# Patient Record
Sex: FEMALE | Race: WHITE | NOT HISPANIC OR LATINO
[De-identification: names, ages, dates, MRNs, and addresses within clinical notes are randomized per-mention and may not be internally consistent; named-entity substitution may affect disease eponyms.]

---

## 2017-01-19 ENCOUNTER — FORM ENCOUNTER (OUTPATIENT)
Age: 52
End: 2017-01-19

## 2017-01-20 ENCOUNTER — APPOINTMENT (OUTPATIENT)
Dept: MAMMOGRAPHY | Facility: IMAGING CENTER | Age: 52
End: 2017-01-20

## 2017-01-20 ENCOUNTER — APPOINTMENT (OUTPATIENT)
Dept: ULTRASOUND IMAGING | Facility: IMAGING CENTER | Age: 52
End: 2017-01-20

## 2017-01-20 ENCOUNTER — OUTPATIENT (OUTPATIENT)
Dept: OUTPATIENT SERVICES | Facility: HOSPITAL | Age: 52
LOS: 1 days | End: 2017-01-20
Payer: COMMERCIAL

## 2017-01-20 DIAGNOSIS — N64.9 DISORDER OF BREAST, UNSPECIFIED: ICD-10-CM

## 2017-01-20 PROCEDURE — 77067 SCR MAMMO BI INCL CAD: CPT

## 2017-01-20 PROCEDURE — 76641 ULTRASOUND BREAST COMPLETE: CPT

## 2017-01-20 PROCEDURE — 77063 BREAST TOMOSYNTHESIS BI: CPT

## 2017-02-08 ENCOUNTER — APPOINTMENT (OUTPATIENT)
Dept: ENDOCRINOLOGY | Facility: CLINIC | Age: 52
End: 2017-02-08

## 2017-02-08 VITALS
SYSTOLIC BLOOD PRESSURE: 100 MMHG | DIASTOLIC BLOOD PRESSURE: 68 MMHG | BODY MASS INDEX: 21.85 KG/M2 | WEIGHT: 128 LBS | HEART RATE: 78 BPM | OXYGEN SATURATION: 98 % | HEIGHT: 64 IN

## 2017-02-09 LAB
25(OH)D3 SERPL-MCNC: 41.4 NG/ML
IRON SERPL-MCNC: 82 UG/DL
T4 FREE SERPL-MCNC: 1.2 NG/DL
THYROGLOB AB SERPL-ACNC: <20 IU/ML
THYROPEROXIDASE AB SERPL IA-ACNC: 897 IU/ML
TSH SERPL-ACNC: 2.09 UIU/ML

## 2017-06-18 ENCOUNTER — RESULT REVIEW (OUTPATIENT)
Age: 52
End: 2017-06-18

## 2017-12-11 ENCOUNTER — APPOINTMENT (OUTPATIENT)
Dept: SURGICAL ONCOLOGY | Facility: CLINIC | Age: 52
End: 2017-12-11
Payer: COMMERCIAL

## 2017-12-11 VITALS
BODY MASS INDEX: 21.85 KG/M2 | RESPIRATION RATE: 14 BRPM | WEIGHT: 128 LBS | SYSTOLIC BLOOD PRESSURE: 106 MMHG | DIASTOLIC BLOOD PRESSURE: 68 MMHG | HEART RATE: 69 BPM | OXYGEN SATURATION: 100 % | HEIGHT: 64 IN

## 2017-12-11 DIAGNOSIS — N64.9 DISORDER OF BREAST, UNSPECIFIED: ICD-10-CM

## 2017-12-11 PROCEDURE — 99213 OFFICE O/P EST LOW 20 MIN: CPT

## 2018-01-21 ENCOUNTER — FORM ENCOUNTER (OUTPATIENT)
Age: 53
End: 2018-01-21

## 2018-01-22 ENCOUNTER — OUTPATIENT (OUTPATIENT)
Dept: OUTPATIENT SERVICES | Facility: HOSPITAL | Age: 53
LOS: 1 days | End: 2018-01-22
Payer: COMMERCIAL

## 2018-01-22 ENCOUNTER — APPOINTMENT (OUTPATIENT)
Dept: MAMMOGRAPHY | Facility: IMAGING CENTER | Age: 53
End: 2018-01-22
Payer: COMMERCIAL

## 2018-01-22 ENCOUNTER — APPOINTMENT (OUTPATIENT)
Dept: ULTRASOUND IMAGING | Facility: IMAGING CENTER | Age: 53
End: 2018-01-22
Payer: COMMERCIAL

## 2018-01-22 DIAGNOSIS — N64.9 DISORDER OF BREAST, UNSPECIFIED: ICD-10-CM

## 2018-01-22 PROCEDURE — 77067 SCR MAMMO BI INCL CAD: CPT | Mod: 26

## 2018-01-22 PROCEDURE — 77063 BREAST TOMOSYNTHESIS BI: CPT

## 2018-01-22 PROCEDURE — 76641 ULTRASOUND BREAST COMPLETE: CPT

## 2018-01-22 PROCEDURE — 77063 BREAST TOMOSYNTHESIS BI: CPT | Mod: 26

## 2018-01-22 PROCEDURE — 76641 ULTRASOUND BREAST COMPLETE: CPT | Mod: 26,50

## 2018-01-22 PROCEDURE — 77067 SCR MAMMO BI INCL CAD: CPT

## 2018-06-20 ENCOUNTER — RESULT REVIEW (OUTPATIENT)
Age: 53
End: 2018-06-20

## 2019-01-28 ENCOUNTER — FORM ENCOUNTER (OUTPATIENT)
Age: 54
End: 2019-01-28

## 2019-01-29 ENCOUNTER — APPOINTMENT (OUTPATIENT)
Dept: MAMMOGRAPHY | Facility: IMAGING CENTER | Age: 54
End: 2019-01-29
Payer: COMMERCIAL

## 2019-01-29 ENCOUNTER — OUTPATIENT (OUTPATIENT)
Dept: OUTPATIENT SERVICES | Facility: HOSPITAL | Age: 54
LOS: 1 days | End: 2019-01-29
Payer: COMMERCIAL

## 2019-01-29 ENCOUNTER — APPOINTMENT (OUTPATIENT)
Dept: ULTRASOUND IMAGING | Facility: IMAGING CENTER | Age: 54
End: 2019-01-29
Payer: COMMERCIAL

## 2019-01-29 DIAGNOSIS — Z00.00 ENCOUNTER FOR GENERAL ADULT MEDICAL EXAMINATION WITHOUT ABNORMAL FINDINGS: ICD-10-CM

## 2019-01-29 PROCEDURE — 77067 SCR MAMMO BI INCL CAD: CPT | Mod: 26

## 2019-01-29 PROCEDURE — 77063 BREAST TOMOSYNTHESIS BI: CPT

## 2019-01-29 PROCEDURE — 77063 BREAST TOMOSYNTHESIS BI: CPT | Mod: 26

## 2019-01-29 PROCEDURE — 76641 ULTRASOUND BREAST COMPLETE: CPT | Mod: 26,50

## 2019-01-29 PROCEDURE — 76641 ULTRASOUND BREAST COMPLETE: CPT

## 2019-01-29 PROCEDURE — 77067 SCR MAMMO BI INCL CAD: CPT

## 2019-02-01 ENCOUNTER — OUTPATIENT (OUTPATIENT)
Dept: OUTPATIENT SERVICES | Facility: HOSPITAL | Age: 54
LOS: 1 days | End: 2019-02-01
Payer: COMMERCIAL

## 2019-02-01 ENCOUNTER — APPOINTMENT (OUTPATIENT)
Dept: RADIOLOGY | Facility: IMAGING CENTER | Age: 54
End: 2019-02-01
Payer: COMMERCIAL

## 2019-02-01 DIAGNOSIS — Z00.8 ENCOUNTER FOR OTHER GENERAL EXAMINATION: ICD-10-CM

## 2019-02-01 PROCEDURE — 77080 DXA BONE DENSITY AXIAL: CPT

## 2019-02-01 PROCEDURE — 77080 DXA BONE DENSITY AXIAL: CPT | Mod: 26

## 2019-07-01 ENCOUNTER — RESULT REVIEW (OUTPATIENT)
Age: 54
End: 2019-07-01

## 2019-07-08 ENCOUNTER — APPOINTMENT (OUTPATIENT)
Dept: ENDOCRINOLOGY | Facility: CLINIC | Age: 54
End: 2019-07-08
Payer: COMMERCIAL

## 2019-07-08 VITALS
SYSTOLIC BLOOD PRESSURE: 110 MMHG | HEART RATE: 65 BPM | DIASTOLIC BLOOD PRESSURE: 70 MMHG | OXYGEN SATURATION: 99 % | WEIGHT: 130 LBS | BODY MASS INDEX: 22.2 KG/M2 | HEIGHT: 64 IN

## 2019-07-08 PROCEDURE — 99214 OFFICE O/P EST MOD 30 MIN: CPT

## 2019-07-08 NOTE — ASSESSMENT
[FreeTextEntry1] : Patient is a 53-year-old female with history of Hashimoto thyroiditis, now with subclinical hypothyroidism, osteoporosis, here for endocrinology followup.\par \par #1 Osteoporosis\par Patient was recently diagnosed with osteoporosis, with femoral neck scored -2.7.\par We will obtain blood work too well secondary causes of osteoporosis.\par Patient is starting calcium supplements 1200 mg once daily total of calcium was recommended.  \par We will check her PTH, vitamin D level today.\par We will repeat her thyroid function today as well.\par \par \par #2 Subclinical Hypothyroidism\par Patient denying any clinical symptoms of hypothyroidism.  For now and monitor with thyroid function tests, informed her that no medication is indicated for now unless a TSH is greater than 10 or if she has significant signs and symptoms of hypothyroidism.  She elected to continue monitoring with lower instead of starting another medication for now\par \par \par I have discussed with the patient that pharmacological therapy is recommended for a patient with a history of fragility fracture or with osteoporosis based upon bone mineral density (BMD) measurement T-score < -2.5.  \par She had no history of esophageal disorder or inability to follow dosing requirement (staying upright for 30 to 60 minutes) or CKD.  Should start Oral bisphosphonate as first line of treatment.  \par \par \par I have recommended the following\par -Get enough calcium and vitamin D, either through diet or\par supplements (at least 1,000-1,200 mg of calcium; 1000-2000IU of vitamin D daily under age 50 or 800-1,000 IU after age 50)\par - Do weight-bearing exercises and stay physically fit, will give referral to physical therapy.  \par - Avoid smoking\par - Don’t drink too much alcohol\par \par Discussed the indications for treatment as well as treatment options including, Bisphosphonates (alendronate, risedronate, ibandronate, zoledronic acid, Denosumab, Raloxifene, as well as teriparatide.  \par   [Bisphosphonate Therapy] : Risks  and benefits of bisphosphonate therapy were  discussed with the patient including gastroesophageal irritation, osteonecrosis of the jaw, and atypical femur fractures, and acute phase reaction [Denosumab Therapy] : Risks  and benefits of denosumab therapy were discussed with the patient including eczema, cellulitis, osteonecrosis of the jaw and atypical femur fractures [Teriparatide Therapy] : Risks  and benefits of Teriparatide therapy were discussed with the patient including potential risk of osteosarcoma

## 2019-07-08 NOTE — HISTORY OF PRESENT ILLNESS
[Low Calcium Intake] : low calcium intake [Premat. Menopause (natural)] : no past or present history of premature natural menopause [Low Vit D Intake/Exposure] : low vitamin D intake [Disordered Eating] : no past or present history of disordered eating [Premat. Menopause (surg)] : no past or present history of premature surgical menopause [Taking Steroids] : no past or present history of taking steroids [Kidney Stones] : no history of kidney stones [Excessive Alcohol Intake] : no past or present history of excessive alcohol intake [Sedentary] : no past or present history of a sedentary lifestyle [Excessive Soda] : no past or present history of excessive soda intake [High Fall Risk] : no fall risk [Current Smoking___(ppd)] : not currently smoking [Uses a Walker/Cane] : no use of a walker/cane [Frequent Falls] : no frequent falls [Family History of Breast Cancer] : no family history of breast cancer [Family History of Osteoporosis] : no family history of osteoporosis [Hyperparathyroidism] : no hyperparathyroidism [Regular Dental Follow-Up] : regular dental follow-up [Family History of Hip Fracture] : no family history of hip fracture [History of Blood Clots] : no history of blood clots [History of Radiation Therapy] : no history of radiation therapy [None] : The patient is currently asymptomatic [Previous Fragility Fracture] : no previous fragility fracture [FreeTextEntry1] : None

## 2019-07-08 NOTE — PHYSICAL EXAM
[Alert] : alert [Well Nourished] : well nourished [No Acute Distress] : no acute distress [Well Developed] : well developed [Normal Sclera/Conjunctiva] : normal sclera/conjunctiva [EOMI] : extra ocular movement intact [No Proptosis] : no proptosis [Normal Oropharynx] : the oropharynx was normal [Thyroid Not Enlarged] : the thyroid was not enlarged [No Thyroid Nodules] : there were no palpable thyroid nodules [No Respiratory Distress] : no respiratory distress [No Accessory Muscle Use] : no accessory muscle use [Normal Rate] : heart rate was normal  [Clear to Auscultation] : lungs were clear to auscultation bilaterally [Normal S1, S2] : normal S1 and S2 [Pedal Pulses Normal] : the pedal pulses are present [Regular Rhythm] : with a regular rhythm [No Edema] : there was no peripheral edema [Normal Bowel Sounds] : normal bowel sounds [Soft] : abdomen soft [Not Tender] : non-tender [Post Cervical Nodes] : posterior cervical nodes [Not Distended] : not distended [Anterior Cervical Nodes] : anterior cervical nodes [Axillary Nodes] : axillary nodes [Normal] : normal and non tender [No Spinal Tenderness] : no spinal tenderness [Spine Straight] : spine straight [No Stigmata of Cushings Syndrome] : no stigmata of cushings syndrome [Normal Gait] : normal gait [Normal Strength/Tone] : muscle strength and tone were normal [Acanthosis Nigricans] : no acanthosis nigricans [No Rash] : no rash [No Tremors] : no tremors [Normal Reflexes] : deep tendon reflexes were 2+ and symmetric [Oriented x3] : oriented to person, place, and time

## 2019-07-08 NOTE — DATA REVIEWED
[FreeTextEntry1] : Jan 2019\par free T4 10.8\par TSH 5.45\par Vitamin D 36.5\par TOTAL CHOLESTEROL 179\par \par TRIGLYCERDIES 36\par A1C 5.1%\par \par ARPIL 2019\par TSH 5.730 UIU/ML\par FREE T4 1.09 NG/D

## 2019-07-09 ENCOUNTER — TRANSCRIPTION ENCOUNTER (OUTPATIENT)
Age: 54
End: 2019-07-09

## 2019-07-26 LAB
25(OH)D3 SERPL-MCNC: 37.1 NG/ML
ALBUMIN SERPL ELPH-MCNC: 4.8 G/DL
ALP BLD-CCNC: 37 U/L
ALT SERPL-CCNC: 6 U/L
ANION GAP SERPL CALC-SCNC: 11 MMOL/L
AST SERPL-CCNC: 11 U/L
BILIRUB SERPL-MCNC: 0.4 MG/DL
BUN SERPL-MCNC: 10 MG/DL
CALCIUM SERPL-MCNC: 9.8 MG/DL
CALCIUM SERPL-MCNC: 9.8 MG/DL
CHLORIDE SERPL-SCNC: 102 MMOL/L
CO2 SERPL-SCNC: 27 MMOL/L
CREAT SERPL-MCNC: 0.64 MG/DL
GLUCOSE SERPL-MCNC: 92 MG/DL
PARATHYROID HORMONE INTACT: 48 PG/ML
PHOSPHATE SERPL-MCNC: 3.6 MG/DL
POTASSIUM SERPL-SCNC: 3.8 MMOL/L
PROT SERPL-MCNC: 6.9 G/DL
SODIUM SERPL-SCNC: 140 MMOL/L
T4 FREE SERPL-MCNC: 0.9 NG/DL
TSH SERPL-ACNC: 5.75 UIU/ML

## 2019-08-08 ENCOUNTER — APPOINTMENT (OUTPATIENT)
Dept: INTERNAL MEDICINE | Facility: CLINIC | Age: 54
End: 2019-08-08
Payer: COMMERCIAL

## 2019-08-08 DIAGNOSIS — Z86.011 PERSONAL HISTORY OF BENIGN NEOPLASM OF THE BRAIN: ICD-10-CM

## 2019-08-08 PROCEDURE — 99204 OFFICE O/P NEW MOD 45 MIN: CPT

## 2019-08-08 NOTE — HISTORY OF PRESENT ILLNESS
[de-identified] : 53 y/o woman presents for initial visit to establish primary care w new internist. previously followed w Dr Prescott, had to switch due to insurance change. she feels well overall, but has a concern re episodes of blood per rectum. specifically, she describes blood only seen on tissue paper when wiping,intermittently for 1-2 weeks. has had prior possible hemorrhoids but she is not sure. mild rectal irritation. no severe pain or copious bleeding. she sees GI Dr Adeline Ha and has appt next week. had colonoscopy about 4 years ago, normal as per pt. \par \par hx of thyroiditis, thyroid function recently stable and not requiring rx. following recently w Dr Dallas and recent labs noted \par \par recent dx of osteoporosis , plan to start bisphosphonate and Vit D/Ca. no hx of fractures\par \par hx of brain dermoid , surgery completed w Dr Svitlana dexter no residual problems \par \par she is UTD w GYN screening and mammography . hx of benign breast disease and dense breasts

## 2019-08-08 NOTE — PHYSICAL EXAM
[Well Nourished] : well nourished [No Acute Distress] : no acute distress [Well Developed] : well developed [Normal Voice/Communication] : normal voice/communication [Well-Appearing] : well-appearing [Normal Sclera/Conjunctiva] : normal sclera/conjunctiva [PERRL] : pupils equal round and reactive to light [Normal Outer Ear/Nose] : the outer ears and nose were normal in appearance [Normal Oropharynx] : the oropharynx was normal [Normal TMs] : both tympanic membranes were normal [No Lymphadenopathy] : no lymphadenopathy [No JVD] : no jugular venous distention [Supple] : supple [Thyroid Normal, No Nodules] : the thyroid was normal and there were no nodules present [No Respiratory Distress] : no respiratory distress  [No Accessory Muscle Use] : no accessory muscle use [Clear to Auscultation] : lungs were clear to auscultation bilaterally [Normal Rate] : normal rate  [Regular Rhythm] : with a regular rhythm [Normal S1, S2] : normal S1 and S2 [No Murmur] : no murmur heard [No Carotid Bruits] : no carotid bruits [No Abdominal Bruit] : a ~M bruit was not heard ~T in the abdomen [No Varicosities] : no varicosities [Pedal Pulses Present] : the pedal pulses are present [No Edema] : there was no peripheral edema [No Palpable Aorta] : no palpable aorta [Declined Breast Exam] : declined breast exam  [Soft] : abdomen soft [Non Tender] : non-tender [Non-distended] : non-distended [No Masses] : no abdominal mass palpated [No HSM] : no HSM [Normal Bowel Sounds] : normal bowel sounds [Normal Posterior Cervical Nodes] : no posterior cervical lymphadenopathy [Normal Supraclavicular Nodes] : no supraclavicular lymphadenopathy [Normal Anterior Cervical Nodes] : no anterior cervical lymphadenopathy [No Spinal Tenderness] : no spinal tenderness [No Joint Swelling] : no joint swelling [Grossly Normal Strength/Tone] : grossly normal strength/tone [No Rash] : no rash [Coordination Grossly Intact] : coordination grossly intact [No Focal Deficits] : no focal deficits [Normal Gait] : normal gait [Normal Affect] : the affect was normal [Normal Mood] : the mood was normal [Alert and Oriented x3] : oriented to person, place, and time [Normal Insight/Judgement] : insight and judgment were intact

## 2019-08-08 NOTE — ASSESSMENT
[FreeTextEntry1] : discussed w pt \par \par reviewed most recent and prior labs \par not on rx currently \par \par cont f/u w endocrine as scheduled. she will be starting bisphosphonates shortly for osteoporosis as well as Vit D/Ca. advised weight bearing exercise \par \par cont GYN screening and breast imaging as scheduled \par \par she has appt w GI next week, referral needed w her insurance , will arrange. most likely rectal bleeding is due to hemorrhoids. will give hydrocortisone rectal cream for now. advised avoid constipation and maintain fluids \par \par f/u in 4-5 months for full physical exam or return earlier prn if any new concerns

## 2019-08-08 NOTE — HEALTH RISK ASSESSMENT
[No] : No [No falls in past year] : Patient reported no falls in the past year [Patient reported PAP Smear was normal] : Patient reported PAP Smear was normal [Patient reported mammogram was normal] : Patient reported mammogram was normal [Patient reported bone density results were abnormal] : Patient reported bone density results were abnormal [Patient reported colonoscopy was normal] : Patient reported colonoscopy was normal [Employed] : employed [None] : None [] :  [] : No [MammogramDate] : 01/19 [PapSmearDate] : 06/19 [BoneDensityDate] : 01/19 [ColonoscopyDate] : 12/15

## 2020-01-08 ENCOUNTER — APPOINTMENT (OUTPATIENT)
Dept: ENDOCRINOLOGY | Facility: CLINIC | Age: 55
End: 2020-01-08
Payer: COMMERCIAL

## 2020-01-08 VITALS
BODY MASS INDEX: 22.71 KG/M2 | OXYGEN SATURATION: 99 % | HEART RATE: 62 BPM | DIASTOLIC BLOOD PRESSURE: 62 MMHG | SYSTOLIC BLOOD PRESSURE: 90 MMHG | WEIGHT: 133 LBS | HEIGHT: 64 IN

## 2020-01-08 PROCEDURE — 99214 OFFICE O/P EST MOD 30 MIN: CPT

## 2020-01-08 RX ORDER — HYDROCORTISONE 25 MG/G
2.5 CREAM TOPICAL
Qty: 1 | Refills: 1 | Status: COMPLETED | COMMUNITY
Start: 2019-08-08 | End: 2020-01-08

## 2020-01-08 NOTE — HISTORY OF PRESENT ILLNESS
[Calcium (dietary)] : dietary Calcium [None] : The patient is not currently taking any medication for this problem [Regular Dental Follow-Up] : regular dental follow-up [Low Calcium Intake] : no past or present history of low calcium intake [FreeTextEntry1] : Ms. ANTONI MAYORGA is 54 year old female here to follow up for Hashimoto thyroiditis. \par \par She was diagnosed hypothyroidism before her pregnancy in 1994, son is now in medical school, 4th year at [employer].  \par She was treated with Synthroid from round [id number] . \par She did IVF afterwards and was pregnant with twins.\par \par Kris is in med school, he is graduating this May from [employer]. \par \par John just graduated in college.  \par \par She had a dermoid surgery for brain and was on Keppra for a couple of months. \par \par She feels well in general, a little fatigue.  But fatigue has been constant.  \par She denies dry skin.  She has no excess hair loss . She reports no constipation, every other day and that hickey't been changed . \par \par Last visit, she was found to have osteoporosis on screening bone density, femoral neck -2.4, total hip -2.7, spine 1.2 osteoprosis. \par \par  [Low Vit D Intake/Exposure] : no past or present history of low vitamin D intake [Premat. Menopause (natural)] : no past or present history of premature natural menopause [Disordered Eating] : no past or present history of disordered eating [Amenorrhea] : no past or present history of amenorrhea [Taking Steroids] : no past or present history of taking steroids [Testosterone Deficiency] : no past or present history of a testosterone deficiency [Excessive Alcohol Intake] : no past or present history of excessive alcohol intake [Excessive Soda] : no past or present history of excessive soda intake [Sedentary] : no past or present history of a sedentary lifestyle [High Fall Risk] : no fall risk [Current Smoking___(ppd)] : not currently smoking [Frequent Falls] : no frequent falls [Uses a Walker/Cane] : no use of a walker/cane [Family History of Breast Cancer] : no family history of breast cancer [Family History of Osteoporosis] : no family history of osteoporosis [Family History of Hip Fracture] : no family history of hip fracture [Hyperparathyroidism] : no hyperparathyroidism [History of Radiation Therapy] : no history of radiation therapy [History of Blood Clots] : no history of blood clots [Previous Fragility Fracture] : no previous fragility fracture

## 2020-01-08 NOTE — PHYSICAL EXAM
[Alert] : alert [No Acute Distress] : no acute distress [Well Nourished] : well nourished [Well Developed] : well developed [Normal Sclera/Conjunctiva] : normal sclera/conjunctiva [EOMI] : extra ocular movement intact [No Proptosis] : no proptosis [Normal Oropharynx] : the oropharynx was normal [Thyroid Not Enlarged] : the thyroid was not enlarged [No Respiratory Distress] : no respiratory distress [No Thyroid Nodules] : there were no palpable thyroid nodules [No Accessory Muscle Use] : no accessory muscle use [Clear to Auscultation] : lungs were clear to auscultation bilaterally [Normal Rate] : heart rate was normal  [Normal S1, S2] : normal S1 and S2 [Regular Rhythm] : with a regular rhythm [No Edema] : there was no peripheral edema [Pedal Pulses Normal] : the pedal pulses are present [Soft] : abdomen soft [Not Tender] : non-tender [Normal Bowel Sounds] : normal bowel sounds [Anterior Cervical Nodes] : anterior cervical nodes [Post Cervical Nodes] : posterior cervical nodes [Not Distended] : not distended [Axillary Nodes] : axillary nodes [Normal] : normal and non tender [No Spinal Tenderness] : no spinal tenderness [No Stigmata of Cushings Syndrome] : no stigmata of cushings syndrome [Spine Straight] : spine straight [Normal Strength/Tone] : muscle strength and tone were normal [Normal Gait] : normal gait [No Rash] : no rash [No Tremors] : no tremors [Normal Reflexes] : deep tendon reflexes were 2+ and symmetric [Oriented x3] : oriented to person, place, and time [Acanthosis Nigricans] : no acanthosis nigricans

## 2020-01-31 ENCOUNTER — APPOINTMENT (OUTPATIENT)
Dept: INTERNAL MEDICINE | Facility: CLINIC | Age: 55
End: 2020-01-31
Payer: COMMERCIAL

## 2020-01-31 ENCOUNTER — NON-APPOINTMENT (OUTPATIENT)
Age: 55
End: 2020-01-31

## 2020-01-31 VITALS
SYSTOLIC BLOOD PRESSURE: 90 MMHG | HEART RATE: 67 BPM | TEMPERATURE: 98.5 F | DIASTOLIC BLOOD PRESSURE: 60 MMHG | WEIGHT: 128.9 LBS | OXYGEN SATURATION: 99 % | HEIGHT: 63.75 IN | BODY MASS INDEX: 22.28 KG/M2

## 2020-01-31 PROCEDURE — 36415 COLL VENOUS BLD VENIPUNCTURE: CPT

## 2020-01-31 PROCEDURE — 99396 PREV VISIT EST AGE 40-64: CPT | Mod: 25

## 2020-01-31 PROCEDURE — 93000 ELECTROCARDIOGRAM COMPLETE: CPT | Mod: 59

## 2020-01-31 PROCEDURE — G0444 DEPRESSION SCREEN ANNUAL: CPT

## 2020-01-31 NOTE — HISTORY OF PRESENT ILLNESS
[de-identified] : 55 y/o woman presents for annual physical exam. she feels well currently, no new concerns. \par \par she reports that her rectal bleeding episodes last year improved, likely due to hemorrhoids, she is seeing her GI Dr Adeline Ha and is scheduled for colonoscopy later this year.\par had colonoscopy about 4 years ago, normal as per pt. \par \par hx of thyroiditis, thyroid function recently stable and not requiring rx, possible subclinical hypothyroid. following recently w Dr Dallas\par \par recent dx of osteoporosis , she declines to start rx, taking Vit D/Ca. no hx of fractures, planning to repeat the DEXA this year w Dr Dallas \par \par hx of brain dermoid , surgery completed w Dr Shane w no residual problems \par \par she is UTD w GYN screening and mammography, Dr Orourke . hx of benign breast disease and dense breasts

## 2020-01-31 NOTE — ASSESSMENT
[FreeTextEntry1] : discussed w pt \par \par check routine labs as below\par monitor thyroid function \par reviewed most recent labs \par \par cont Vit D/Ca \par cont f/u w endocrine as scheduled. has declined rx for osteoporosis, cont weight bearing exercise \par \par cont GYN screening and breast imaging as scheduled \par \par she will be having colonoscopy screening later this year w her GI \par \par declines influenza vaccine or Shingrix at this time \par \par f/u yearly for routine physical exam or return earlier prn if any new concerns

## 2020-01-31 NOTE — PHYSICAL EXAM
[No Acute Distress] : no acute distress [Well Nourished] : well nourished [Well Developed] : well developed [Well-Appearing] : well-appearing [Normal Voice/Communication] : normal voice/communication [Normal Sclera/Conjunctiva] : normal sclera/conjunctiva [PERRL] : pupils equal round and reactive to light [Normal Outer Ear/Nose] : the outer ears and nose were normal in appearance [Normal Oropharynx] : the oropharynx was normal [Normal TMs] : both tympanic membranes were normal [No JVD] : no jugular venous distention [No Lymphadenopathy] : no lymphadenopathy [Supple] : supple [Thyroid Normal, No Nodules] : the thyroid was normal and there were no nodules present [No Respiratory Distress] : no respiratory distress  [No Accessory Muscle Use] : no accessory muscle use [Clear to Auscultation] : lungs were clear to auscultation bilaterally [Normal Rate] : normal rate  [Regular Rhythm] : with a regular rhythm [Normal S1, S2] : normal S1 and S2 [No Murmur] : no murmur heard [No Carotid Bruits] : no carotid bruits [No Abdominal Bruit] : a ~M bruit was not heard ~T in the abdomen [No Varicosities] : no varicosities [Pedal Pulses Present] : the pedal pulses are present [No Edema] : there was no peripheral edema [No Palpable Aorta] : no palpable aorta [Declined Breast Exam] : declined breast exam  [Soft] : abdomen soft [Non Tender] : non-tender [Non-distended] : non-distended [No Masses] : no abdominal mass palpated [No HSM] : no HSM [Normal Bowel Sounds] : normal bowel sounds [Normal Supraclavicular Nodes] : no supraclavicular lymphadenopathy [Normal Posterior Cervical Nodes] : no posterior cervical lymphadenopathy [Normal Anterior Cervical Nodes] : no anterior cervical lymphadenopathy [No Spinal Tenderness] : no spinal tenderness [No Joint Swelling] : no joint swelling [Grossly Normal Strength/Tone] : grossly normal strength/tone [No Rash] : no rash [Coordination Grossly Intact] : coordination grossly intact [No Focal Deficits] : no focal deficits [Normal Gait] : normal gait [Normal Affect] : the affect was normal [Alert and Oriented x3] : oriented to person, place, and time [Normal Mood] : the mood was normal [Normal Insight/Judgement] : insight and judgment were intact

## 2020-01-31 NOTE — DATA REVIEWED
[FreeTextEntry1] : EKG - NSr @ 64, nml axis, nonspecific T wave flattening, no ST or t wave changes

## 2020-01-31 NOTE — HEALTH RISK ASSESSMENT
[No] : In the past 12 months have you used drugs other than those required for medical reasons? No [No falls in past year] : Patient reported no falls in the past year [Patient reported mammogram was normal] : Patient reported mammogram was normal [Patient reported PAP Smear was normal] : Patient reported PAP Smear was normal [Patient reported bone density results were abnormal] : Patient reported bone density results were abnormal [Patient reported colonoscopy was normal] : Patient reported colonoscopy was normal [None] : None [Employed] : employed [] :  [] : No [MammogramDate] : 01/19 [PapSmearDate] : 06/19 [BoneDensityDate] : 01/19 [ColonoscopyDate] : 12/15

## 2020-02-10 ENCOUNTER — APPOINTMENT (OUTPATIENT)
Dept: INTERNAL MEDICINE | Facility: CLINIC | Age: 55
End: 2020-02-10
Payer: COMMERCIAL

## 2020-02-10 VITALS
SYSTOLIC BLOOD PRESSURE: 100 MMHG | DIASTOLIC BLOOD PRESSURE: 70 MMHG | HEART RATE: 79 BPM | TEMPERATURE: 99.4 F | OXYGEN SATURATION: 97 %

## 2020-02-10 DIAGNOSIS — J06.9 ACUTE UPPER RESPIRATORY INFECTION, UNSPECIFIED: ICD-10-CM

## 2020-02-10 LAB
25(OH)D3 SERPL-MCNC: 34.1 NG/ML
ALBUMIN SERPL ELPH-MCNC: 4.7 G/DL
ALP BLD-CCNC: 35 U/L
ALT SERPL-CCNC: 10 U/L
ANION GAP SERPL CALC-SCNC: 12 MMOL/L
APPEARANCE: CLEAR
AST SERPL-CCNC: 11 U/L
BASOPHILS # BLD AUTO: 0.04 K/UL
BASOPHILS NFR BLD AUTO: 0.7 %
BILIRUB SERPL-MCNC: 0.4 MG/DL
BILIRUBIN URINE: NEGATIVE
BLOOD URINE: NEGATIVE
BUN SERPL-MCNC: 8 MG/DL
CALCIUM SERPL-MCNC: 10.1 MG/DL
CHLORIDE SERPL-SCNC: 104 MMOL/L
CHOLEST SERPL-MCNC: 178 MG/DL
CHOLEST/HDLC SERPL: 3.3 RATIO
CO2 SERPL-SCNC: 26 MMOL/L
COLOR: YELLOW
CREAT SERPL-MCNC: 0.66 MG/DL
EOSINOPHIL # BLD AUTO: 0.07 K/UL
EOSINOPHIL NFR BLD AUTO: 1.3 %
ESTIMATED AVERAGE GLUCOSE: 105 MG/DL
GLUCOSE QUALITATIVE U: NEGATIVE
GLUCOSE SERPL-MCNC: 84 MG/DL
HBA1C MFR BLD HPLC: 5.3 %
HCT VFR BLD CALC: 39.8 %
HDLC SERPL-MCNC: 55 MG/DL
HGB BLD-MCNC: 13 G/DL
IMM GRANULOCYTES NFR BLD AUTO: 0.2 %
KETONES URINE: NEGATIVE
LDLC SERPL CALC-MCNC: 113 MG/DL
LEUKOCYTE ESTERASE URINE: NEGATIVE
LYMPHOCYTES # BLD AUTO: 1.75 K/UL
LYMPHOCYTES NFR BLD AUTO: 32.5 %
MAN DIFF?: NORMAL
MCHC RBC-ENTMCNC: 31.9 PG
MCHC RBC-ENTMCNC: 32.7 GM/DL
MCV RBC AUTO: 97.8 FL
MONOCYTES # BLD AUTO: 0.5 K/UL
MONOCYTES NFR BLD AUTO: 9.3 %
NEUTROPHILS # BLD AUTO: 3.02 K/UL
NEUTROPHILS NFR BLD AUTO: 56 %
NITRITE URINE: NEGATIVE
PH URINE: 7
PLATELET # BLD AUTO: 240 K/UL
POTASSIUM SERPL-SCNC: 4.4 MMOL/L
PROT SERPL-MCNC: 6.9 G/DL
PROTEIN URINE: NORMAL
RBC # BLD: 4.07 M/UL
RBC # FLD: 12.7 %
SODIUM SERPL-SCNC: 143 MMOL/L
SPECIFIC GRAVITY URINE: 1.02
T4 FREE SERPL-MCNC: 1.1 NG/DL
TRIGL SERPL-MCNC: 52 MG/DL
TSH SERPL-ACNC: 4.68 UIU/ML
UROBILINOGEN URINE: NORMAL
WBC # FLD AUTO: 5.39 K/UL

## 2020-02-10 PROCEDURE — 99213 OFFICE O/P EST LOW 20 MIN: CPT

## 2020-02-10 NOTE — PHYSICAL EXAM
[No Acute Distress] : no acute distress [Normal Sclera/Conjunctiva] : normal sclera/conjunctiva [Normal Voice/Communication] : normal voice/communication [Normal Outer Ear/Nose] : the outer ears and nose were normal in appearance [Normal Oropharynx] : the oropharynx was normal [Normal TMs] : both tympanic membranes were normal [No Lymphadenopathy] : no lymphadenopathy [No Respiratory Distress] : no respiratory distress  [No Accessory Muscle Use] : no accessory muscle use [Clear to Auscultation] : lungs were clear to auscultation bilaterally [Normal Gait] : normal gait [Normal Affect] : the affect was normal [Normal Insight/Judgement] : insight and judgment were intact [Normal Mood] : the mood was normal

## 2020-02-10 NOTE — HISTORY OF PRESENT ILLNESS
[FreeTextEntry8] : presents for eval of 2-3 days of dry cough along w occasional muscle aches, mild chills. no dyspnea ,recorded fever, vomiting, sputum production or severe fatigue. took otc delsum w mild relief

## 2020-02-10 NOTE — REVIEW OF SYSTEMS
[Chills] : chills [Cough] : cough [Negative] : Heme/Lymph [Fever] : no fever [Fatigue] : no fatigue [Night Sweats] : no night sweats [Discharge] : no discharge [Earache] : no earache [Hoarseness] : no hoarseness [Nasal Discharge] : no nasal discharge [Sore Throat] : no sore throat [Postnasal Drip] : no postnasal drip [Chest Pain] : no chest pain [Shortness Of Breath] : no shortness of breath [Wheezing] : no wheezing [Vomiting] : no vomiting

## 2020-02-10 NOTE — ASSESSMENT
[FreeTextEntry1] : discussed w pt \par advised increase oral fluids\par likely viral etiology \par trial of prometh/DM for cough suppression \par consider antibx if worsening symptoms or no improvement \par call prn if no improvement

## 2020-02-24 ENCOUNTER — APPOINTMENT (OUTPATIENT)
Dept: INTERNAL MEDICINE | Facility: CLINIC | Age: 55
End: 2020-02-24
Payer: COMMERCIAL

## 2020-02-24 VITALS
HEIGHT: 64.5 IN | HEART RATE: 86 BPM | OXYGEN SATURATION: 98 % | SYSTOLIC BLOOD PRESSURE: 96 MMHG | DIASTOLIC BLOOD PRESSURE: 60 MMHG | WEIGHT: 128 LBS | BODY MASS INDEX: 21.59 KG/M2 | TEMPERATURE: 98.8 F

## 2020-02-24 DIAGNOSIS — J40 BRONCHITIS, NOT SPECIFIED AS ACUTE OR CHRONIC: ICD-10-CM

## 2020-02-24 PROCEDURE — 99213 OFFICE O/P EST LOW 20 MIN: CPT

## 2020-02-24 NOTE — HISTORY OF PRESENT ILLNESS
[FreeTextEntry8] : presents for f/u , reevaluation for persistent cough. now she has developed some chest congestion and has occasional yellow sputum production. no fever, chills, dyspnea, fatigue. tried cough suppressant, little improvement.

## 2020-02-24 NOTE — PLAN
[FreeTextEntry1] : discussed w pt \par vitals normal for her today\par maintain oral fluids\par persistent cough w some sputum production, heavy cough at times \par will treat as bronchitis at this point and start cephalexin bid x 7 days \par ordered CXR , which she can have done later this week if there is no improvement in cough. she will call if any questions/concerns \par \par call or return prn if needed

## 2020-02-24 NOTE — PHYSICAL EXAM
[No Acute Distress] : no acute distress [Normal Sclera/Conjunctiva] : normal sclera/conjunctiva [Normal Voice/Communication] : normal voice/communication [Normal TMs] : both tympanic membranes were normal [Normal Outer Ear/Nose] : the outer ears and nose were normal in appearance [Normal Oropharynx] : the oropharynx was normal [No Lymphadenopathy] : no lymphadenopathy [No Respiratory Distress] : no respiratory distress  [Normal Supraclavicular Nodes] : no supraclavicular lymphadenopathy [Clear to Auscultation] : lungs were clear to auscultation bilaterally [No Accessory Muscle Use] : no accessory muscle use [Normal Posterior Cervical Nodes] : no posterior cervical lymphadenopathy [Normal Anterior Cervical Nodes] : no anterior cervical lymphadenopathy [Normal Insight/Judgement] : insight and judgment were intact [Normal Gait] : normal gait [Normal Mood] : the mood was normal [de-identified] : coughing

## 2020-02-24 NOTE — REVIEW OF SYSTEMS
[Fever] : no fever [Chills] : no chills [Fatigue] : no fatigue [Night Sweats] : no night sweats [Discharge] : no discharge [Nasal Discharge] : no nasal discharge [Chest Pain] : no chest pain [Orthopnea] : no orthopnea [Shortness Of Breath] : no shortness of breath [Wheezing] : no wheezing [Cough] : cough [Vomiting] : no vomiting [Negative] : Heme/Lymph

## 2020-03-02 ENCOUNTER — APPOINTMENT (OUTPATIENT)
Dept: ENDOCRINOLOGY | Facility: CLINIC | Age: 55
End: 2020-03-02

## 2020-06-02 ENCOUNTER — APPOINTMENT (OUTPATIENT)
Dept: MAMMOGRAPHY | Facility: IMAGING CENTER | Age: 55
End: 2020-06-02
Payer: COMMERCIAL

## 2020-06-02 ENCOUNTER — APPOINTMENT (OUTPATIENT)
Dept: ULTRASOUND IMAGING | Facility: IMAGING CENTER | Age: 55
End: 2020-06-02
Payer: COMMERCIAL

## 2020-06-02 ENCOUNTER — OUTPATIENT (OUTPATIENT)
Dept: OUTPATIENT SERVICES | Facility: HOSPITAL | Age: 55
LOS: 1 days | End: 2020-06-02
Payer: COMMERCIAL

## 2020-06-02 DIAGNOSIS — Z00.8 ENCOUNTER FOR OTHER GENERAL EXAMINATION: ICD-10-CM

## 2020-06-02 PROCEDURE — 77067 SCR MAMMO BI INCL CAD: CPT

## 2020-06-02 PROCEDURE — 77067 SCR MAMMO BI INCL CAD: CPT | Mod: 26

## 2020-06-02 PROCEDURE — 77063 BREAST TOMOSYNTHESIS BI: CPT

## 2020-06-02 PROCEDURE — 77063 BREAST TOMOSYNTHESIS BI: CPT | Mod: 26

## 2020-06-02 PROCEDURE — 76641 ULTRASOUND BREAST COMPLETE: CPT

## 2020-06-02 PROCEDURE — 76641 ULTRASOUND BREAST COMPLETE: CPT | Mod: 26,50

## 2020-07-13 ENCOUNTER — APPOINTMENT (OUTPATIENT)
Dept: ENDOCRINOLOGY | Facility: CLINIC | Age: 55
End: 2020-07-13
Payer: COMMERCIAL

## 2020-07-13 VITALS
HEIGHT: 64.5 IN | TEMPERATURE: 98 F | SYSTOLIC BLOOD PRESSURE: 110 MMHG | DIASTOLIC BLOOD PRESSURE: 80 MMHG | WEIGHT: 130 LBS | BODY MASS INDEX: 21.93 KG/M2

## 2020-07-13 LAB
T4 FREE SERPL-MCNC: 1.1 NG/DL
TSH SERPL-ACNC: 4.47 UIU/ML

## 2020-07-13 PROCEDURE — 99213 OFFICE O/P EST LOW 20 MIN: CPT

## 2020-07-13 RX ORDER — PROMETHAZINE HYDROCHLORIDE AND DEXTROMETHORPHAN HYDROBROMIDE ORAL SOLUTION 15; 6.25 MG/5ML; MG/5ML
6.25-15 SOLUTION ORAL EVERY 8 HOURS
Qty: 120 | Refills: 0 | Status: COMPLETED | COMMUNITY
Start: 2020-02-10 | End: 2020-07-13

## 2020-07-13 RX ORDER — CEPHALEXIN 500 MG/1
500 TABLET ORAL
Qty: 14 | Refills: 0 | Status: COMPLETED | COMMUNITY
Start: 2020-02-24 | End: 2020-07-13

## 2020-07-13 NOTE — ASSESSMENT
[FreeTextEntry1] : Patient is a 54-year-old female with history of Hashimoto thyroiditis, now with subclinical hypothyroidism, osteoporosis, here for endocrinology followup.\par \par #1 Osteoporosis\par Patient was recently diagnosed with osteoporosis, with femoral neck scored -2.7.\par Work up for secondary osteoporosis including a parathyroid hormone, vitamin D level, calcium, as well as TSH level is within normal limits.  Patient had no premature menopause.  She does not want to start medications for osteoporosis.  We will repeat a bone density today.  We will see if there is progression of osteoporosis.  At that time she will consider treatment for osteoporosis with either bisphosphonates or Prolia.\par \par #2 Subclinical Hypothyroidism\par Patient denying any clinical symptoms of hypothyroidism.  For now and monitor with thyroid function tests, informed her that no medication is indicated for now unless a TSH is greater than 10 or if she has significant signs and symptoms of hypothyroidism.  She elected to continue monitoring with lower instead of starting another medication for now.  Her last TFT was done in February 2020 showing a mildly elevated TSH level of 4.68 and a normal free T4 level of 1.1 ng/dL.  We will repeat thyroid function today.\par \par \par I have discussed with the patient that pharmacological therapy is recommended for a patient with a history of fragility fracture or with osteoporosis based upon bone mineral density (BMD) measurement T-score < -2.5.  \par She had no history of esophageal disorder or inability to follow dosing requirement (staying upright for 30 to 60 minutes) or CKD.  Should start Oral bisphosphonate as first line of treatment.  \par \par \par I have recommended the following\par -Get enough calcium and vitamin D, either through diet or\par supplements (at least 1,000-1,200 mg of calcium; 1000-2000IU of vitamin D daily under age 50 or 800-1,000 IU after age 50)\par - Do weight-bearing exercises and stay physically fit, will give referral to physical therapy.  \par - Avoid smoking\par - Don’t drink too much alcohol\par \par Discussed the indications for treatment as well as treatment options including, Bisphosphonates (alendronate, risedronate, ibandronate, zoledronic acid, Denosumab, Raloxifene, as well as teriparatide.  \par

## 2020-07-13 NOTE — HISTORY OF PRESENT ILLNESS
[FreeTextEntry1] : Ms. ANTONI MAYORGA is 54 year old female here to follow up for Hashimoto thyroiditis. \par \par She was diagnosed hypothyroidism before her pregnancy in 1994, son is now in medical school, her son is starting anesthesia residency. \par \par She was treated with Synthroid from round 0840-7168 . \par She did IVF afterwards and was pregnant with twins.\par \par Pritesh and Glenny just graduated in college.  \par \par She had a dermoid surgery for brain and was on Keppra for a couple of months. \par \par She feels well in general, a little fatigue.  But fatigue has been constant.  \par She denies dry skin.  She has no excess hair loss . She reports no constipation, every other day and that hickey't been changed . \par \par Last visit, she was found to have osteoporosis on screening bone density, femoral neck -2.4, total hip -2.7, spine 1.2 osteoporosis. \par \par

## 2020-07-13 NOTE — PHYSICAL EXAM
[Alert] : alert [Well Nourished] : well nourished [No Acute Distress] : no acute distress [Well Developed] : well developed [Normal Sclera/Conjunctiva] : normal sclera/conjunctiva [EOMI] : extra ocular movement intact [No Proptosis] : no proptosis [Normal Oropharynx] : the oropharynx was normal [Thyroid Not Enlarged] : the thyroid was not enlarged [No Thyroid Nodules] : no palpable thyroid nodules [No Accessory Muscle Use] : no accessory muscle use [No Respiratory Distress] : no respiratory distress [Clear to Auscultation] : lungs were clear to auscultation bilaterally [Normal S1, S2] : normal S1 and S2 [Normal Rate] : heart rate was normal [Regular Rhythm] : with a regular rhythm [No Edema] : no peripheral edema [Pedal Pulses Normal] : the pedal pulses are present [Not Distended] : not distended [Normal Anterior Cervical Nodes] : no anterior cervical lymphadenopathy [Normal Posterior Cervical Nodes] : no posterior cervical lymphadenopathy [Spine Straight] : spine straight [No Spinal Tenderness] : no spinal tenderness [No Stigmata of Cushings Syndrome] : no stigmata of Cushings Syndrome [Normal Gait] : normal gait [No Rash] : no rash [Normal Reflexes] : deep tendon reflexes were 2+ and symmetric [No Tremors] : no tremors [Oriented x3] : oriented to person, place, and time [Normal Insight/Judgement] : insight and judgment were intact [Normal Affect] : the affect was normal [Acanthosis Nigricans] : no acanthosis nigricans [Normal Mood] : the mood was normal

## 2020-08-03 ENCOUNTER — RESULT REVIEW (OUTPATIENT)
Age: 55
End: 2020-08-03

## 2020-09-24 ENCOUNTER — APPOINTMENT (OUTPATIENT)
Dept: ENDOCRINOLOGY | Facility: CLINIC | Age: 55
End: 2020-09-24
Payer: COMMERCIAL

## 2020-09-24 VITALS — WEIGHT: 130 LBS | BODY MASS INDEX: 22.2 KG/M2 | HEIGHT: 64 IN | TEMPERATURE: 98.2 F

## 2020-09-24 PROCEDURE — 77085 DXA BONE DENSITY AXL VRT FX: CPT

## 2020-12-23 PROBLEM — J06.9 URI WITH COUGH AND CONGESTION: Status: RESOLVED | Noted: 2020-02-10 | Resolved: 2020-12-23

## 2021-01-15 ENCOUNTER — APPOINTMENT (OUTPATIENT)
Dept: ENDOCRINOLOGY | Facility: CLINIC | Age: 56
End: 2021-01-15
Payer: COMMERCIAL

## 2021-01-15 VITALS
WEIGHT: 131 LBS | HEIGHT: 64 IN | SYSTOLIC BLOOD PRESSURE: 118 MMHG | TEMPERATURE: 97.6 F | BODY MASS INDEX: 22.36 KG/M2 | DIASTOLIC BLOOD PRESSURE: 70 MMHG

## 2021-01-15 PROCEDURE — 99072 ADDL SUPL MATRL&STAF TM PHE: CPT

## 2021-01-15 PROCEDURE — 99214 OFFICE O/P EST MOD 30 MIN: CPT

## 2021-01-15 NOTE — PHYSICAL EXAM
[Alert] : alert [Well Nourished] : well nourished [No Acute Distress] : no acute distress [Well Developed] : well developed [Normal Sclera/Conjunctiva] : normal sclera/conjunctiva [EOMI] : extra ocular movement intact [No Proptosis] : no proptosis [Normal Oropharynx] : the oropharynx was normal [Thyroid Not Enlarged] : the thyroid was not enlarged [No Thyroid Nodules] : no palpable thyroid nodules [No Respiratory Distress] : no respiratory distress [No Accessory Muscle Use] : no accessory muscle use [Clear to Auscultation] : lungs were clear to auscultation bilaterally [Normal S1, S2] : normal S1 and S2 [Normal Rate] : heart rate was normal [Regular Rhythm] : with a regular rhythm [No Edema] : no peripheral edema [Pedal Pulses Normal] : the pedal pulses are present [Not Distended] : not distended [Normal Anterior Cervical Nodes] : no anterior cervical lymphadenopathy [No Spinal Tenderness] : no spinal tenderness [Spine Straight] : spine straight [No Stigmata of Cushings Syndrome] : no stigmata of Cushings Syndrome [Normal Gait] : normal gait [No Rash] : no rash [Normal Reflexes] : deep tendon reflexes were 2+ and symmetric [No Tremors] : no tremors [Oriented x3] : oriented to person, place, and time [Normal Affect] : the affect was normal [Normal Insight/Judgement] : insight and judgment were intact [Normal Mood] : the mood was normal [Acanthosis Nigricans] : no acanthosis nigricans

## 2021-01-15 NOTE — ASSESSMENT
[FreeTextEntry1] : Patient is a 55-year-old female with history of Hashimoto thyroiditis, now with subclinical hypothyroidism, osteoporosis, here for endocrinology followup.\par \par #1 Osteoporosis\par Patient was recently diagnosed with osteoporosis, with femoral neck scored -2.7.\par Work up for secondary osteoporosis including a parathyroid hormone, vitamin D level, calcium, as well as TSH level is within normal limits.  Patient had no premature menopause.  She does not want to start medications for osteoporosis.  \par Will check back with me in one month.  \par \par \par \par #2 Subclinical Hypothyroidism\par Patient denying any clinical symptoms of hypothyroidism.  For now and monitor with thyroid function tests, informed her that no medication is indicated for now unless a TSH is greater than 10 or if she has significant signs and symptoms of hypothyroidism.  She elected to continue monitoring with lower instead of starting another medication for now.  Her last TFT was done in February 2020 showing a mildly elevated TSH level of 4.68 and a normal free T4 level of 1.1 ng/dL.  We will repeat thyroid function today.\par \par \par I have discussed with the patient that pharmacological therapy is recommended for a patient with a history of fragility fracture or with osteoporosis based upon bone mineral density (BMD) measurement T-score < -2.5.  \par She had no history of esophageal disorder or inability to follow dosing requirement (staying upright for 30 to 60 minutes) or CKD.  Should start Oral bisphosphonate as first line of treatment.  \par \par \par I have recommended the following\par -Get enough calcium and vitamin D, either through diet or\par supplements (at least 1,000-1,200 mg of calcium; 1000-2000IU of vitamin D daily under age 50 or 800-1,000 IU after age 50)\par - Do weight-bearing exercises and stay physically fit, will give referral to physical therapy.  \par - Avoid smoking\par - Don’t drink too much alcohol\par \par Discussed the indications for treatment as well as treatment options including, Bisphosphonates (alendronate, risedronate, ibandronate, zoledronic acid, Denosumab, Raloxifene, as well as teriparatide.  \par

## 2021-01-15 NOTE — HISTORY OF PRESENT ILLNESS
[FreeTextEntry1] : Ms. ANTONI MAYORGA is 54 year old female here to follow up for Hashimoto thyroiditis. \par \par She was diagnosed hypothyroidism before her pregnancy in 1994, son is now in medical school, her son is starting anesthesia residency. \par \par She was treated with Synthroid from round 8308-9209 . \par She did IVF afterwards and was pregnant with twins.\par \par Pritesh and Glenny just graduated in college.  \par \par She had a dermoid surgery for brain and was on Keppra for a couple of months. \par \par She feels well in general, a little fatigue.  But fatigue has been constant.  \par She denies dry skin.  She has no excess hair loss . She reports no constipation, every other day and that hickey't been changed . \par \par Last visit, she was found to have osteoporosis on screening bone density, femoral neck -2.4, total hip -2.7, spine 1.2 osteoporosis. \par \par She was given referral for physical therapy which she had not gone yet.\par \par Patient did extensive research on treatment for osteoporosis, she wanted to start with Prolia for treatment of her osteoporosis.

## 2021-02-05 ENCOUNTER — LABORATORY RESULT (OUTPATIENT)
Age: 56
End: 2021-02-05

## 2021-02-05 ENCOUNTER — NON-APPOINTMENT (OUTPATIENT)
Age: 56
End: 2021-02-05

## 2021-02-05 ENCOUNTER — APPOINTMENT (OUTPATIENT)
Dept: INTERNAL MEDICINE | Facility: CLINIC | Age: 56
End: 2021-02-05
Payer: COMMERCIAL

## 2021-02-05 VITALS
SYSTOLIC BLOOD PRESSURE: 100 MMHG | HEIGHT: 64.25 IN | WEIGHT: 127.6 LBS | BODY MASS INDEX: 21.78 KG/M2 | DIASTOLIC BLOOD PRESSURE: 66 MMHG | OXYGEN SATURATION: 99 % | HEART RATE: 75 BPM | TEMPERATURE: 97.2 F

## 2021-02-05 DIAGNOSIS — Z11.59 ENCOUNTER FOR SCREENING FOR OTHER VIRAL DISEASES: ICD-10-CM

## 2021-02-05 DIAGNOSIS — Z87.19 PERSONAL HISTORY OF OTHER DISEASES OF THE DIGESTIVE SYSTEM: ICD-10-CM

## 2021-02-05 PROCEDURE — 99396 PREV VISIT EST AGE 40-64: CPT | Mod: 25

## 2021-02-05 PROCEDURE — G0444 DEPRESSION SCREEN ANNUAL: CPT

## 2021-02-05 PROCEDURE — 93000 ELECTROCARDIOGRAM COMPLETE: CPT | Mod: 59

## 2021-02-05 PROCEDURE — 36415 COLL VENOUS BLD VENIPUNCTURE: CPT

## 2021-02-05 PROCEDURE — 99072 ADDL SUPL MATRL&STAF TM PHE: CPT

## 2021-02-05 NOTE — ASSESSMENT
[FreeTextEntry1] : discussed w pt \par \par check routine labs as below\par monitor thyroid function \par \par cont Vit D/Ca \par cont f/u w endocrine as scheduled. has declined rx for osteoporosis, cont weight bearing exercise \par \par cont GYN screening and breast imaging as scheduled \par \par she plans to schedule colonoscopy screening later this year w her GI \par \par declines influenza vaccine or Shingrix vaccine \par \par RTO yearly for routine physical exam or return earlier prn if any new concerns

## 2021-02-05 NOTE — HEALTH RISK ASSESSMENT
[No] : In the past 12 months have you used drugs other than those required for medical reasons? No [Patient reported mammogram was normal] : Patient reported mammogram was normal [Patient reported PAP Smear was normal] : Patient reported PAP Smear was normal [Patient reported bone density results were abnormal] : Patient reported bone density results were abnormal [Patient reported colonoscopy was normal] : Patient reported colonoscopy was normal [None] : None [Employed] : employed [] :  [] : No [MammogramDate] : 06/20 [PapSmearDate] : 08/20 [BoneDensityDate] : 09/20 [ColonoscopyDate] : 12/15

## 2021-02-05 NOTE — HISTORY OF PRESENT ILLNESS
[de-identified] : 56 y/o woman presents for annual physical exam. she feels well currently, no new concerns. no illnesses during COVID19 pandemic. \par \par no recurrence of rectal bleeding, she is seeing her GI Dr Adeline Ha , had advised her likely due to anal fissures, she used topical creams. she plans to have a colonoscopy later this year \par hx of thyroiditis, thyroid function recently stable and not requiring rx, subclinical hypothyroid. following recently w Dr Dallas endocrine\par osteoporosis noted on DEXA, following w Dr Dallas, pt has declined to start rx . she is taking Vit D/Ca\par \par hx of brain dermoid , surgery completed w Dr Svitlana dexter no residual problems \par \par she is UTD w GYN screening and mammography, Dr Orourke . hx of benign breast disease and dense breasts

## 2021-02-05 NOTE — PHYSICAL EXAM
[Well Nourished] : well nourished [Well Developed] : well developed [Well-Appearing] : well-appearing [Normal Voice/Communication] : normal voice/communication [Normal Sclera/Conjunctiva] : normal sclera/conjunctiva [PERRL] : pupils equal round and reactive to light [Normal Outer Ear/Nose] : the outer ears and nose were normal in appearance [Normal Oropharynx] : the oropharynx was normal [Normal TMs] : both tympanic membranes were normal [No JVD] : no jugular venous distention [No Lymphadenopathy] : no lymphadenopathy [Supple] : supple [Thyroid Normal, No Nodules] : the thyroid was normal and there were no nodules present [No Respiratory Distress] : no respiratory distress  [No Accessory Muscle Use] : no accessory muscle use [Clear to Auscultation] : lungs were clear to auscultation bilaterally [Normal Rate] : normal rate  [Regular Rhythm] : with a regular rhythm [Normal S1, S2] : normal S1 and S2 [No Murmur] : no murmur heard [No Carotid Bruits] : no carotid bruits [No Abdominal Bruit] : a ~M bruit was not heard ~T in the abdomen [No Varicosities] : no varicosities [Pedal Pulses Present] : the pedal pulses are present [No Edema] : there was no peripheral edema [No Palpable Aorta] : no palpable aorta [Declined Breast Exam] : declined breast exam  [Soft] : abdomen soft [Non Tender] : non-tender [Non-distended] : non-distended [No Masses] : no abdominal mass palpated [No HSM] : no HSM [Normal Bowel Sounds] : normal bowel sounds [Normal Supraclavicular Nodes] : no supraclavicular lymphadenopathy [Normal Posterior Cervical Nodes] : no posterior cervical lymphadenopathy [Normal Anterior Cervical Nodes] : no anterior cervical lymphadenopathy [No Spinal Tenderness] : no spinal tenderness [No Joint Swelling] : no joint swelling [Grossly Normal Strength/Tone] : grossly normal strength/tone [No Rash] : no rash [Coordination Grossly Intact] : coordination grossly intact [No Focal Deficits] : no focal deficits [Normal Gait] : normal gait [Normal Affect] : the affect was normal [Alert and Oriented x3] : oriented to person, place, and time [Normal Mood] : the mood was normal [Normal Insight/Judgement] : insight and judgment were intact [No Acute Distress] : no acute distress [Speech Grossly Normal] : speech grossly normal

## 2021-05-10 LAB
25(OH)D3 SERPL-MCNC: 38.4 NG/ML
ALBUMIN SERPL ELPH-MCNC: 4.6 G/DL
ALP BLD-CCNC: 41 U/L
ALT SERPL-CCNC: 11 U/L
ANION GAP SERPL CALC-SCNC: 12 MMOL/L
APPEARANCE: CLEAR
AST SERPL-CCNC: 13 U/L
BASOPHILS # BLD AUTO: 0.05 K/UL
BASOPHILS NFR BLD AUTO: 1.2 %
BILIRUB SERPL-MCNC: 0.3 MG/DL
BILIRUBIN URINE: NEGATIVE
BLOOD URINE: NEGATIVE
BUN SERPL-MCNC: 9 MG/DL
CALCIUM SERPL-MCNC: 9.8 MG/DL
CHLORIDE SERPL-SCNC: 103 MMOL/L
CHOLEST SERPL-MCNC: 181 MG/DL
CO2 SERPL-SCNC: 25 MMOL/L
COLOR: NORMAL
CREAT SERPL-MCNC: 0.71 MG/DL
EOSINOPHIL # BLD AUTO: 0.07 K/UL
EOSINOPHIL NFR BLD AUTO: 1.6 %
ESTIMATED AVERAGE GLUCOSE: 100 MG/DL
GLUCOSE QUALITATIVE U: NEGATIVE
GLUCOSE SERPL-MCNC: 90 MG/DL
HBA1C MFR BLD HPLC: 5.1 %
HCT VFR BLD CALC: 40.2 %
HDLC SERPL-MCNC: 52 MG/DL
HGB BLD-MCNC: 12.7 G/DL
IMM GRANULOCYTES NFR BLD AUTO: 0 %
KETONES URINE: NEGATIVE
LDLC SERPL CALC-MCNC: 121 MG/DL
LEUKOCYTE ESTERASE URINE: NEGATIVE
LYMPHOCYTES # BLD AUTO: 1.96 K/UL
LYMPHOCYTES NFR BLD AUTO: 45.7 %
MAN DIFF?: NORMAL
MCHC RBC-ENTMCNC: 31.3 PG
MCHC RBC-ENTMCNC: 31.6 GM/DL
MCV RBC AUTO: 99 FL
MONOCYTES # BLD AUTO: 0.4 K/UL
MONOCYTES NFR BLD AUTO: 9.3 %
NEUTROPHILS # BLD AUTO: 1.81 K/UL
NEUTROPHILS NFR BLD AUTO: 42.2 %
NITRITE URINE: NEGATIVE
NONHDLC SERPL-MCNC: 129 MG/DL
PH URINE: 7
PLATELET # BLD AUTO: 247 K/UL
POTASSIUM SERPL-SCNC: 4.1 MMOL/L
PROT SERPL-MCNC: 7 G/DL
PROTEIN URINE: NEGATIVE
RBC # BLD: 4.06 M/UL
RBC # FLD: 13.1 %
SODIUM SERPL-SCNC: 140 MMOL/L
SPECIFIC GRAVITY URINE: 1.01
T4 FREE SERPL-MCNC: 1 NG/DL
TRIGL SERPL-MCNC: 42 MG/DL
TSH SERPL-ACNC: 5.18 UIU/ML
UROBILINOGEN URINE: NORMAL
WBC # FLD AUTO: 4.29 K/UL

## 2021-06-03 ENCOUNTER — APPOINTMENT (OUTPATIENT)
Dept: ULTRASOUND IMAGING | Facility: IMAGING CENTER | Age: 56
End: 2021-06-03
Payer: COMMERCIAL

## 2021-06-03 ENCOUNTER — OUTPATIENT (OUTPATIENT)
Dept: OUTPATIENT SERVICES | Facility: HOSPITAL | Age: 56
LOS: 1 days | End: 2021-06-03
Payer: COMMERCIAL

## 2021-06-03 ENCOUNTER — APPOINTMENT (OUTPATIENT)
Dept: MAMMOGRAPHY | Facility: IMAGING CENTER | Age: 56
End: 2021-06-03
Payer: COMMERCIAL

## 2021-06-03 ENCOUNTER — RESULT REVIEW (OUTPATIENT)
Age: 56
End: 2021-06-03

## 2021-06-03 DIAGNOSIS — Z00.8 ENCOUNTER FOR OTHER GENERAL EXAMINATION: ICD-10-CM

## 2021-06-03 PROCEDURE — 77063 BREAST TOMOSYNTHESIS BI: CPT | Mod: 26

## 2021-06-03 PROCEDURE — 77067 SCR MAMMO BI INCL CAD: CPT | Mod: 26

## 2021-06-03 PROCEDURE — 77067 SCR MAMMO BI INCL CAD: CPT

## 2021-06-03 PROCEDURE — 76641 ULTRASOUND BREAST COMPLETE: CPT

## 2021-06-03 PROCEDURE — 76641 ULTRASOUND BREAST COMPLETE: CPT | Mod: 26,50

## 2021-06-03 PROCEDURE — 77063 BREAST TOMOSYNTHESIS BI: CPT

## 2021-07-30 ENCOUNTER — APPOINTMENT (OUTPATIENT)
Dept: ENDOCRINOLOGY | Facility: CLINIC | Age: 56
End: 2021-07-30
Payer: COMMERCIAL

## 2021-07-30 VITALS
TEMPERATURE: 97.8 F | DIASTOLIC BLOOD PRESSURE: 64 MMHG | WEIGHT: 127 LBS | HEART RATE: 62 BPM | SYSTOLIC BLOOD PRESSURE: 100 MMHG | BODY MASS INDEX: 21.63 KG/M2 | OXYGEN SATURATION: 99 %

## 2021-07-30 PROCEDURE — 99214 OFFICE O/P EST MOD 30 MIN: CPT

## 2021-07-30 NOTE — ASSESSMENT
[FreeTextEntry1] : Patient is a 55-year-old female with history of Hashimoto thyroiditis, now with subclinical hypothyroidism, osteoporosis, here for endocrinology followup.\par \par 1.  Osteoporosis\par Patient was recently diagnosed with osteoporosis, with femoral neck scored -2.7.\par Work up for secondary osteoporosis including a parathyroid hormone, vitamin D level, calcium, as well as TSH level is within normal limits.  Patient had no premature menopause.  She does not want to start medications for osteoporosis.  \par Patient elected to start with Fosamax.  Prescription for Fosamax 70 mg once weekly sent to her pharmacy.\par Discussed the correct way to take the medication.  Discussed the small risks of atypical fracture as well as osteonecrosis of the jaw.  Information pamphlet given to patient.  Patient has a dental cleaning coming up next week.  She will start the medication afterwards.\par We will repeat bone mineral density in 6 months.\par \par 2.  Subclinical Hypothyroidism\par Patient denying any clinical symptoms of hypothyroidism.  For now and monitor with thyroid function tests, informed her that no medication is indicated for now unless a TSH is greater than 10 or if she has significant signs and symptoms of hypothyroidism.  She elected to continue monitoring with lower instead of starting another medication for now.  Her last TFT was done in February 2020 showing a mildly elevated TSH level of 4.68 and a normal free T4 level of 1.1 ng/dL.  We will repeat thyroid function today.\par \par \par   [Bisphosphonates] : The patient was instructed to take bisphosphonates on an empty stomach with a full glass of water,and wait at least 30 minutes before eating or lying down

## 2021-07-30 NOTE — PHYSICAL EXAM
[Alert] : alert [Well Nourished] : well nourished [No Acute Distress] : no acute distress [Well Developed] : well developed [Normal Sclera/Conjunctiva] : normal sclera/conjunctiva [EOMI] : extra ocular movement intact [No Proptosis] : no proptosis [Normal Oropharynx] : the oropharynx was normal [Thyroid Not Enlarged] : the thyroid was not enlarged [No Thyroid Nodules] : no palpable thyroid nodules [No Respiratory Distress] : no respiratory distress [No Accessory Muscle Use] : no accessory muscle use [Clear to Auscultation] : lungs were clear to auscultation bilaterally [Normal S1, S2] : normal S1 and S2 [Normal Rate] : heart rate was normal [Regular Rhythm] : with a regular rhythm [No Edema] : no peripheral edema [Pedal Pulses Normal] : the pedal pulses are present [Not Distended] : not distended [Normal Anterior Cervical Nodes] : no anterior cervical lymphadenopathy [No Spinal Tenderness] : no spinal tenderness [Spine Straight] : spine straight [No Stigmata of Cushings Syndrome] : no stigmata of Cushings Syndrome [Normal Gait] : normal gait [No Rash] : no rash [Acanthosis Nigricans] : no acanthosis nigricans [Normal Reflexes] : deep tendon reflexes were 2+ and symmetric [No Tremors] : no tremors [Oriented x3] : oriented to person, place, and time [Normal Affect] : the affect was normal [Normal Insight/Judgement] : insight and judgment were intact [Normal Mood] : the mood was normal

## 2021-07-30 NOTE — HISTORY OF PRESENT ILLNESS
[FreeTextEntry1] : Ms. ANTONI MAYORGA is 54 year old female here to follow up for Hashimoto thyroiditis. \par \par She was diagnosed hypothyroidism before her pregnancy in 1994, son is now in medical school, her son is starting anesthesia residency. \par \par She was treated with Synthroid from round [id number] . \par She did IVF afterwards and was pregnant with twins.\par \par Pritesh and Glenny just graduated in college.  \par \par She had a dermoid surgery for brain and was on Keppra for a couple of months. \par \par She feels well in general, a little fatigue.  But fatigue has been constant.  \par She denies dry skin.  She has no excess hair loss . She reports no constipation, every other day and that hickey't been changed . \par \par Last visit, she was found to have osteoporosis on screening bone density, femoral neck -2.4, total hip -2.7, spine 1.2 osteoporosis.  At that time she was not interested in starting any medication for osteoporosis.\par \par She was given referral for physical therapy which she had not gone yet.\par \par Patient did extensive research on treatment for osteoporosis, she wanted to start with Prolia for treatment of her osteoporosis.

## 2021-08-02 ENCOUNTER — APPOINTMENT (OUTPATIENT)
Dept: ENDOCRINOLOGY | Facility: CLINIC | Age: 56
End: 2021-08-02

## 2021-08-02 LAB
25(OH)D3 SERPL-MCNC: 43 NG/ML
T4 FREE SERPL-MCNC: 1.1 NG/DL
TSH SERPL-ACNC: 5.94 UIU/ML

## 2022-01-18 ENCOUNTER — RX RENEWAL (OUTPATIENT)
Age: 57
End: 2022-01-18

## 2022-02-10 ENCOUNTER — APPOINTMENT (OUTPATIENT)
Dept: INTERNAL MEDICINE | Facility: CLINIC | Age: 57
End: 2022-02-10
Payer: COMMERCIAL

## 2022-02-10 ENCOUNTER — NON-APPOINTMENT (OUTPATIENT)
Age: 57
End: 2022-02-10

## 2022-02-10 VITALS
BODY MASS INDEX: 21.85 KG/M2 | WEIGHT: 128 LBS | SYSTOLIC BLOOD PRESSURE: 90 MMHG | OXYGEN SATURATION: 98 % | TEMPERATURE: 97.8 F | DIASTOLIC BLOOD PRESSURE: 60 MMHG | HEART RATE: 71 BPM | HEIGHT: 64 IN

## 2022-02-10 PROCEDURE — 36415 COLL VENOUS BLD VENIPUNCTURE: CPT

## 2022-02-10 PROCEDURE — G0444 DEPRESSION SCREEN ANNUAL: CPT | Mod: 59

## 2022-02-10 PROCEDURE — 93000 ELECTROCARDIOGRAM COMPLETE: CPT | Mod: 59

## 2022-02-10 PROCEDURE — 99396 PREV VISIT EST AGE 40-64: CPT | Mod: 25

## 2022-02-10 NOTE — REVIEW OF SYSTEMS
COMMENTS:  Infant on DOL 2 or 33 weeks corrected. UV in place, required for nutrition, adequate position on xray. CMV not detected.     PLANS:  - Provide developmental care  - Monitor UV placement on subsequent xrays.     [Negative] : Heme/Lymph

## 2022-02-10 NOTE — HISTORY OF PRESENT ILLNESS
[de-identified] : 55 y/o woman presents for annual physical exam. she feels well currently, no new concerns. no illnesses. \par she had COVID vaccines x 2 doses. \par \par \par hx of thyroiditis, thyroid function recently stable and not requiring rx, subclinical hypothyroid. following w Dr Dallas endocrine\par osteoporosis, following w Dr Dallas, she is taking Vit D/Ca, exercising. she was advised to start alendronate which she took for few weeks but did not like how she felt so she stopped rx \par \par hx of brain dermoid , surgery completed w Dr Svitlana dexter no residual problems \par \par she is UTD w GYN screening and mammography, Dr Orourke . hx of benign breast disease and dense breasts \par \par colonoscopy updated in 2021 w Dr Mari Ha, tubular adenoma noted, repeat 5 years advised

## 2022-02-10 NOTE — ASSESSMENT
[FreeTextEntry1] : discussed w pt \par \par reviewed recent endocrine f/u, osteoporosis tx. she declines to restart bisphosphonate, she prefers to treat conservatively for now. advised on weight bearing exercise, cont vit D. recheck DEXa later this year \par \par check routine labs as below\par monitor thyroid function  \par \par cont GYN screening and breast imaging as scheduled \par \par colonoscopy UTD in 2021, benign polyp, repeat in 5 years \par \par reviewed vaccines, she had COVID vaccines x 2 doses, she will consider booster \par declines influenza vaccine or Shingrix vaccine \par \par RTO yearly for routine physical exam or return earlier prn if any new concerns

## 2022-02-10 NOTE — PHYSICAL EXAM
[No Acute Distress] : no acute distress [Well Nourished] : well nourished [Well Developed] : well developed [Well-Appearing] : well-appearing [Normal Voice/Communication] : normal voice/communication [Normal Sclera/Conjunctiva] : normal sclera/conjunctiva [PERRL] : pupils equal round and reactive to light [Normal Outer Ear/Nose] : the outer ears and nose were normal in appearance [Normal Oropharynx] : the oropharynx was normal [Normal TMs] : both tympanic membranes were normal [No JVD] : no jugular venous distention [No Lymphadenopathy] : no lymphadenopathy [Supple] : supple [Thyroid Normal, No Nodules] : the thyroid was normal and there were no nodules present [No Respiratory Distress] : no respiratory distress  [No Accessory Muscle Use] : no accessory muscle use [Clear to Auscultation] : lungs were clear to auscultation bilaterally [Normal Rate] : normal rate  [Regular Rhythm] : with a regular rhythm [Normal S1, S2] : normal S1 and S2 [No Murmur] : no murmur heard [No Carotid Bruits] : no carotid bruits [No Abdominal Bruit] : a ~M bruit was not heard ~T in the abdomen [No Varicosities] : no varicosities [Pedal Pulses Present] : the pedal pulses are present [No Edema] : there was no peripheral edema [No Palpable Aorta] : no palpable aorta [Declined Breast Exam] : declined breast exam  [Soft] : abdomen soft [Non Tender] : non-tender [Non-distended] : non-distended [No Masses] : no abdominal mass palpated [No HSM] : no HSM [Normal Bowel Sounds] : normal bowel sounds [Normal Supraclavicular Nodes] : no supraclavicular lymphadenopathy [Normal Posterior Cervical Nodes] : no posterior cervical lymphadenopathy [Normal Anterior Cervical Nodes] : no anterior cervical lymphadenopathy [No Spinal Tenderness] : no spinal tenderness [No Joint Swelling] : no joint swelling [Grossly Normal Strength/Tone] : grossly normal strength/tone [No Rash] : no rash [Coordination Grossly Intact] : coordination grossly intact [No Focal Deficits] : no focal deficits [Normal Gait] : normal gait [Speech Grossly Normal] : speech grossly normal [Normal Affect] : the affect was normal [Alert and Oriented x3] : oriented to person, place, and time [Normal Mood] : the mood was normal [Normal Insight/Judgement] : insight and judgment were intact [de-identified] : done w GYN

## 2022-02-10 NOTE — HEALTH RISK ASSESSMENT
[No] : In the past 12 months have you used drugs other than those required for medical reasons? No [Patient reported mammogram was normal] : Patient reported mammogram was normal [Patient reported PAP Smear was normal] : Patient reported PAP Smear was normal [Patient reported bone density results were abnormal] : Patient reported bone density results were abnormal [None] : None [Employed] : employed [] :  [Never] : Never [MammogramDate] : 06/21 [PapSmearDate] : 2021 [BoneDensityDate] : 09/20 [ColonoscopyDate] : 11/21 [ColonoscopyComments] : - benign polyp -repeat 5 years advised

## 2022-04-11 PROBLEM — Z11.59 SCREENING FOR VIRAL DISEASE: Status: ACTIVE | Noted: 2021-02-05

## 2022-05-06 LAB
25(OH)D3 SERPL-MCNC: 37.1 NG/ML
ALBUMIN SERPL ELPH-MCNC: 4.6 G/DL
ALP BLD-CCNC: 42 U/L
ALT SERPL-CCNC: 9 U/L
ANION GAP SERPL CALC-SCNC: 13 MMOL/L
APPEARANCE: CLEAR
AST SERPL-CCNC: 12 U/L
BASOPHILS # BLD AUTO: 0.05 K/UL
BASOPHILS NFR BLD AUTO: 1.3 %
BILIRUB SERPL-MCNC: 0.5 MG/DL
BILIRUBIN URINE: NEGATIVE
BLOOD URINE: NEGATIVE
BUN SERPL-MCNC: 9 MG/DL
CALCIUM SERPL-MCNC: 9.9 MG/DL
CHLORIDE SERPL-SCNC: 104 MMOL/L
CHOLEST SERPL-MCNC: 182 MG/DL
CO2 SERPL-SCNC: 24 MMOL/L
COLOR: NORMAL
COVID-19 SPIKE DOMAIN ANTIBODY INTERPRETATION: POSITIVE
CREAT SERPL-MCNC: 0.69 MG/DL
EOSINOPHIL # BLD AUTO: 0.05 K/UL
EOSINOPHIL NFR BLD AUTO: 1.3 %
ESTIMATED AVERAGE GLUCOSE: 108 MG/DL
GLUCOSE QUALITATIVE U: NEGATIVE
GLUCOSE SERPL-MCNC: 88 MG/DL
HBA1C MFR BLD HPLC: 5.4 %
HCT VFR BLD CALC: 40.4 %
HDLC SERPL-MCNC: 49 MG/DL
HGB BLD-MCNC: 12.9 G/DL
IMM GRANULOCYTES NFR BLD AUTO: 0 %
KETONES URINE: NEGATIVE
LDLC SERPL CALC-MCNC: 124 MG/DL
LEUKOCYTE ESTERASE URINE: NEGATIVE
LYMPHOCYTES # BLD AUTO: 2.03 K/UL
LYMPHOCYTES NFR BLD AUTO: 52.3 %
MAN DIFF?: NORMAL
MCHC RBC-ENTMCNC: 30.5 PG
MCHC RBC-ENTMCNC: 31.9 GM/DL
MCV RBC AUTO: 95.5 FL
MONOCYTES # BLD AUTO: 0.38 K/UL
MONOCYTES NFR BLD AUTO: 9.8 %
NEUTROPHILS # BLD AUTO: 1.37 K/UL
NEUTROPHILS NFR BLD AUTO: 35.3 %
NITRITE URINE: NEGATIVE
NONHDLC SERPL-MCNC: 133 MG/DL
PH URINE: 7.5
PLATELET # BLD AUTO: 244 K/UL
POTASSIUM SERPL-SCNC: 4.3 MMOL/L
PROT SERPL-MCNC: 6.7 G/DL
PROTEIN URINE: NEGATIVE
RBC # BLD: 4.23 M/UL
RBC # FLD: 12.5 %
SARS-COV-2 AB SERPL IA-ACNC: >250 U/ML
SODIUM SERPL-SCNC: 141 MMOL/L
SPECIFIC GRAVITY URINE: 1.02
T4 FREE SERPL-MCNC: 1.1 NG/DL
TRIGL SERPL-MCNC: 44 MG/DL
TSH SERPL-ACNC: 2.3 UIU/ML
UROBILINOGEN URINE: NORMAL
WBC # FLD AUTO: 3.88 K/UL

## 2022-06-06 ENCOUNTER — APPOINTMENT (OUTPATIENT)
Dept: MAMMOGRAPHY | Facility: IMAGING CENTER | Age: 57
End: 2022-06-06
Payer: COMMERCIAL

## 2022-06-06 ENCOUNTER — OUTPATIENT (OUTPATIENT)
Dept: OUTPATIENT SERVICES | Facility: HOSPITAL | Age: 57
LOS: 1 days | End: 2022-06-06
Payer: COMMERCIAL

## 2022-06-06 ENCOUNTER — APPOINTMENT (OUTPATIENT)
Dept: ULTRASOUND IMAGING | Facility: IMAGING CENTER | Age: 57
End: 2022-06-06
Payer: COMMERCIAL

## 2022-06-06 DIAGNOSIS — Z00.8 ENCOUNTER FOR OTHER GENERAL EXAMINATION: ICD-10-CM

## 2022-06-06 PROCEDURE — 77067 SCR MAMMO BI INCL CAD: CPT | Mod: 26

## 2022-06-06 PROCEDURE — 77063 BREAST TOMOSYNTHESIS BI: CPT | Mod: 26

## 2022-06-06 PROCEDURE — 76641 ULTRASOUND BREAST COMPLETE: CPT | Mod: 26,50

## 2022-06-06 PROCEDURE — 76641 ULTRASOUND BREAST COMPLETE: CPT

## 2022-06-06 PROCEDURE — 77063 BREAST TOMOSYNTHESIS BI: CPT

## 2022-06-06 PROCEDURE — 77067 SCR MAMMO BI INCL CAD: CPT

## 2022-06-14 ENCOUNTER — OUTPATIENT (OUTPATIENT)
Dept: OUTPATIENT SERVICES | Facility: HOSPITAL | Age: 57
LOS: 1 days | End: 2022-06-14
Payer: COMMERCIAL

## 2022-06-14 ENCOUNTER — APPOINTMENT (OUTPATIENT)
Dept: ULTRASOUND IMAGING | Facility: IMAGING CENTER | Age: 57
End: 2022-06-14
Payer: COMMERCIAL

## 2022-06-14 DIAGNOSIS — Z00.8 ENCOUNTER FOR OTHER GENERAL EXAMINATION: ICD-10-CM

## 2022-06-14 PROCEDURE — 76642 ULTRASOUND BREAST LIMITED: CPT

## 2022-06-14 PROCEDURE — 76642 ULTRASOUND BREAST LIMITED: CPT | Mod: 26,RT

## 2022-07-14 ENCOUNTER — APPOINTMENT (OUTPATIENT)
Dept: INTERNAL MEDICINE | Facility: CLINIC | Age: 57
End: 2022-07-14

## 2022-11-15 ENCOUNTER — APPOINTMENT (OUTPATIENT)
Dept: ENDOCRINOLOGY | Facility: CLINIC | Age: 57
End: 2022-11-15

## 2022-11-15 VITALS — OXYGEN SATURATION: 98 % | DIASTOLIC BLOOD PRESSURE: 70 MMHG | HEART RATE: 64 BPM | SYSTOLIC BLOOD PRESSURE: 100 MMHG

## 2022-11-15 VITALS — WEIGHT: 129 LBS | HEIGHT: 64.3 IN | BODY MASS INDEX: 22.02 KG/M2 | TEMPERATURE: 97.3 F

## 2022-11-15 PROCEDURE — 99214 OFFICE O/P EST MOD 30 MIN: CPT

## 2022-11-15 PROCEDURE — ZZZZZ: CPT

## 2022-11-15 RX ORDER — BROMPHENIRAMINE MALEATE, PSEUDOEPHEDRINE HYDROCHLORIDE, 2; 30; 10 MG/5ML; MG/5ML; MG/5ML
30-2-10 SYRUP ORAL
Qty: 236 | Refills: 0 | Status: COMPLETED | COMMUNITY
Start: 2022-05-22

## 2022-11-15 RX ORDER — AZITHROMYCIN 250 MG/1
250 TABLET, FILM COATED ORAL
Qty: 6 | Refills: 0 | Status: COMPLETED | COMMUNITY
Start: 2022-05-22

## 2022-11-15 NOTE — ASSESSMENT
[FreeTextEntry1] : Patient is a 55-year-old female with history of Hashimoto thyroiditis, now with subclinical hypothyroidism, osteoporosis, here for endocrinology followup.\par \par 1.  Osteoporosis\par Patient was recently diagnosed with osteoporosis, with femoral neck scored -2.7.\par Work up for secondary osteoporosis including a parathyroid hormone, vitamin D level, calcium, as well as TSH level is within normal limits.  Patient had no premature menopause. \par Patient elected to start with Fosamax.  Prescription for Fosamax 70 mg once weekly sent to her pharmacy.\par Discussed the correct way to take the medication.  Discussed the small risks of atypical fracture as well as osteonecrosis of the jaw. \par Information about osteoporosis provided for patient and printed.\par She was prescribed alendronate last year but never took it.\par Given worsening bone mineral density, treatment options discussed again.\par Patient elected to start with Fosamax 70 mg once weekly.\par I will follow-up with her in 6 months when sure adherence to medication.\par \par 2.  Subclinical Hypothyroidism\par Patient denying any clinical symptoms of hypothyroidism.  For now and monitor with thyroid function tests, informed her that no medication is indicated for now unless a TSH is greater than 10 or if she has significant signs and symptoms of hypothyroidism.  She elected to continue monitoring with lower instead of starting another medication for now. \par We will be seeing her primary care doctor next month.\par To repeat TFT in 2023.\par \par

## 2022-11-15 NOTE — RESULTS/DATA
[FreeTextEntry1] : September 24, 2020\par Model Horizon W serial number [id number]\par DSA result summary:\par L1-L4: BMD 0.850, T score -1.8, Z score -0.6\par Total hip: BMD 0.615, T score -2.7, Z score -1.9\par Femoral neck: BMD 0.530, T score -2.9, Z score -1.7

## 2022-11-15 NOTE — HISTORY OF PRESENT ILLNESS
[FreeTextEntry1] : Ms. ANTONI MAYORGA is 57 year old female here to follow up for Hashimoto thyroiditis. \par \par She was diagnosed hypothyroidism before her pregnancy in 1994, son is now in medical school, her son is starting anesthesia residency. \par \par She was treated with Synthroid from round 6744-7117 . \par She did IVF afterwards and was pregnant with twins.\par \par Pritesh and Glenny graduated in 2019.  They are working from home.  Her oldest old is a anesthesiology resident\par \par \par She had a dermoid surgery for brain and was on Keppra for a couple of months. \par \par She's taking Vitamin D 2000 Iu daily.  \par \par She feels well in general, a little fatigue.  But fatigue has been constant.  \par She denies dry skin.  She has no excess hair loss . She reports no constipation, every other day and that hickey't been changed . \par \par Last visit, she was found to have osteoporosis on screening bone density, femoral neck -2.4, total hip -2.7, spine 1.2 osteoporosis.  At that time she was not interested in starting any medication for osteoporosis.\par \par She was given referral for physical therapy which she had not gone yet.  \par \par She was prescribed alendronate last year but never took the medication.\par \par

## 2023-01-09 ENCOUNTER — APPOINTMENT (OUTPATIENT)
Dept: ULTRASOUND IMAGING | Facility: IMAGING CENTER | Age: 58
End: 2023-01-09
Payer: COMMERCIAL

## 2023-01-09 ENCOUNTER — OUTPATIENT (OUTPATIENT)
Dept: OUTPATIENT SERVICES | Facility: HOSPITAL | Age: 58
LOS: 1 days | End: 2023-01-09
Payer: COMMERCIAL

## 2023-01-09 DIAGNOSIS — Z00.8 ENCOUNTER FOR OTHER GENERAL EXAMINATION: ICD-10-CM

## 2023-01-09 PROCEDURE — 76642 ULTRASOUND BREAST LIMITED: CPT | Mod: 26,RT

## 2023-01-09 PROCEDURE — 76642 ULTRASOUND BREAST LIMITED: CPT

## 2023-02-21 ENCOUNTER — APPOINTMENT (OUTPATIENT)
Dept: INTERNAL MEDICINE | Facility: CLINIC | Age: 58
End: 2023-02-21

## 2023-03-01 ENCOUNTER — APPOINTMENT (OUTPATIENT)
Dept: INTERNAL MEDICINE | Facility: CLINIC | Age: 58
End: 2023-03-01
Payer: COMMERCIAL

## 2023-03-01 PROCEDURE — 36415 COLL VENOUS BLD VENIPUNCTURE: CPT

## 2023-03-09 ENCOUNTER — NON-APPOINTMENT (OUTPATIENT)
Age: 58
End: 2023-03-09

## 2023-03-09 ENCOUNTER — APPOINTMENT (OUTPATIENT)
Dept: INTERNAL MEDICINE | Facility: CLINIC | Age: 58
End: 2023-03-09
Payer: COMMERCIAL

## 2023-03-09 VITALS
HEIGHT: 64 IN | HEART RATE: 72 BPM | BODY MASS INDEX: 21.94 KG/M2 | DIASTOLIC BLOOD PRESSURE: 60 MMHG | OXYGEN SATURATION: 99 % | TEMPERATURE: 98.1 F | SYSTOLIC BLOOD PRESSURE: 90 MMHG | WEIGHT: 128.5 LBS

## 2023-03-09 LAB
25(OH)D3 SERPL-MCNC: 42.1 NG/ML
ALBUMIN SERPL ELPH-MCNC: 4.7 G/DL
ALP BLD-CCNC: 39 U/L
ALT SERPL-CCNC: 12 U/L
ANION GAP SERPL CALC-SCNC: 12 MMOL/L
APPEARANCE: CLEAR
AST SERPL-CCNC: 18 U/L
BASOPHILS # BLD AUTO: 0.04 K/UL
BASOPHILS NFR BLD AUTO: 1.1 %
BILIRUB SERPL-MCNC: 0.4 MG/DL
BILIRUBIN URINE: NEGATIVE
BLOOD URINE: NEGATIVE
BUN SERPL-MCNC: 11 MG/DL
CALCIUM SERPL-MCNC: 10.1 MG/DL
CHLORIDE SERPL-SCNC: 102 MMOL/L
CHOLEST SERPL-MCNC: 200 MG/DL
CO2 SERPL-SCNC: 26 MMOL/L
COLOR: YELLOW
CREAT SERPL-MCNC: 0.64 MG/DL
EGFR: 102 ML/MIN/1.73M2
EOSINOPHIL # BLD AUTO: 0.05 K/UL
EOSINOPHIL NFR BLD AUTO: 1.3 %
ESTIMATED AVERAGE GLUCOSE: 114 MG/DL
GLUCOSE QUALITATIVE U: NEGATIVE
GLUCOSE SERPL-MCNC: 88 MG/DL
HBA1C MFR BLD HPLC: 5.6 %
HCT VFR BLD CALC: 42.1 %
HDLC SERPL-MCNC: 56 MG/DL
HGB BLD-MCNC: 13.2 G/DL
IMM GRANULOCYTES NFR BLD AUTO: 0 %
KETONES URINE: NEGATIVE
LDLC SERPL CALC-MCNC: 137 MG/DL
LEUKOCYTE ESTERASE URINE: NEGATIVE
LYMPHOCYTES # BLD AUTO: 1.99 K/UL
LYMPHOCYTES NFR BLD AUTO: 52.4 %
MAN DIFF?: NORMAL
MCHC RBC-ENTMCNC: 31.4 GM/DL
MCHC RBC-ENTMCNC: 31.4 PG
MCV RBC AUTO: 100 FL
MONOCYTES # BLD AUTO: 0.34 K/UL
MONOCYTES NFR BLD AUTO: 8.9 %
NEUTROPHILS # BLD AUTO: 1.38 K/UL
NEUTROPHILS NFR BLD AUTO: 36.3 %
NITRITE URINE: NEGATIVE
NONHDLC SERPL-MCNC: 144 MG/DL
PH URINE: 7
PLATELET # BLD AUTO: 257 K/UL
POTASSIUM SERPL-SCNC: 4.1 MMOL/L
PROT SERPL-MCNC: 6.8 G/DL
PROTEIN URINE: NORMAL
RBC # BLD: 4.21 M/UL
RBC # FLD: 13 %
SODIUM SERPL-SCNC: 140 MMOL/L
SPECIFIC GRAVITY URINE: 1.02
T4 FREE SERPL-MCNC: 1.1 NG/DL
TRIGL SERPL-MCNC: 37 MG/DL
TSH SERPL-ACNC: 2.7 UIU/ML
UROBILINOGEN URINE: NORMAL
WBC # FLD AUTO: 3.8 K/UL

## 2023-03-09 PROCEDURE — G0444 DEPRESSION SCREEN ANNUAL: CPT | Mod: 59

## 2023-03-09 PROCEDURE — 99396 PREV VISIT EST AGE 40-64: CPT | Mod: 25

## 2023-03-09 PROCEDURE — 93000 ELECTROCARDIOGRAM COMPLETE: CPT | Mod: 59

## 2023-03-09 NOTE — PHYSICAL EXAM
[No Acute Distress] : no acute distress [Well Nourished] : well nourished [Well Developed] : well developed [Well-Appearing] : well-appearing [Normal Voice/Communication] : normal voice/communication [Normal Sclera/Conjunctiva] : normal sclera/conjunctiva [PERRL] : pupils equal round and reactive to light [Normal Outer Ear/Nose] : the outer ears and nose were normal in appearance [Normal Oropharynx] : the oropharynx was normal [Normal TMs] : both tympanic membranes were normal [No JVD] : no jugular venous distention [No Lymphadenopathy] : no lymphadenopathy [Supple] : supple [Thyroid Normal, No Nodules] : the thyroid was normal and there were no nodules present [No Respiratory Distress] : no respiratory distress  [No Accessory Muscle Use] : no accessory muscle use [Clear to Auscultation] : lungs were clear to auscultation bilaterally [Normal Rate] : normal rate  [Regular Rhythm] : with a regular rhythm [Normal S1, S2] : normal S1 and S2 [No Murmur] : no murmur heard [No Carotid Bruits] : no carotid bruits [No Abdominal Bruit] : a ~M bruit was not heard ~T in the abdomen [No Varicosities] : no varicosities [Pedal Pulses Present] : the pedal pulses are present [No Edema] : there was no peripheral edema [No Palpable Aorta] : no palpable aorta [Declined Breast Exam] : declined breast exam  [Soft] : abdomen soft [Non Tender] : non-tender [Non-distended] : non-distended [No Masses] : no abdominal mass palpated [No HSM] : no HSM [Normal Bowel Sounds] : normal bowel sounds [No Spinal Tenderness] : no spinal tenderness [No Joint Swelling] : no joint swelling [Grossly Normal Strength/Tone] : grossly normal strength/tone [No Rash] : no rash [Coordination Grossly Intact] : coordination grossly intact [No Focal Deficits] : no focal deficits [Normal Gait] : normal gait [Speech Grossly Normal] : speech grossly normal [Alert and Oriented x3] : oriented to person, place, and time [Normal Affect] : the affect was normal [Normal Mood] : the mood was normal [Normal Insight/Judgement] : insight and judgment were intact [de-identified] : done w GYN

## 2023-03-09 NOTE — DATA REVIEWED
[FreeTextEntry1] : EKG - NSR @ 68, borderline LAFB, nonspecific T wave flattening, unchanged compared to prior

## 2023-03-09 NOTE — ASSESSMENT
[FreeTextEntry1] : discussed w pt \par \par reviewed updated hx\par \par reviewed current labs in detail\par thyroid function wnl , Vit D wnl \par \par advised on low cholesterol intake, cont to maintain health weight and diet, monitor lipids \par \par cont management of osteoporosis, vit D , exercise, endocrine f/u \par \par cont GYN screening and breast imaging as scheduled \par \par colonoscopy UTD in 2021, benign polyp, repeat in 5 years \par \par reviewed vaccines, she had COVID vaccines x 2 doses, she will consider Shingrix vaccine \par \par RTO yearly for routine physical exam or return earlier prn if any new concerns

## 2023-03-09 NOTE — HISTORY OF PRESENT ILLNESS
Counseling Text: Pt was counseled through the portal
Detail Level: Detailed
[de-identified] : 58 y/o woman presents for annual physical exam. she feels well currently, no new concerns. \par lab testing completed prior to visit and reviewed today. \par \par hx of thyroiditis, thyroid function recently stable and not requiring rx, subclinical hypothyroid. following w Dr Dallas endocrine. current TFTs wnl. \par osteoporosis, following w Dr Dallas, she is taking Vit D/Ca, exercising. declines to restart bisphosphonates \par \par hx of brain dermoid , surgery completed w Dr Svitlana dexter no residual problems \par \par she is UTD w GYN screening and mammography, Dr Orourke . hx of benign breast disease and dense breasts tissue, due for mammography in 6/23 \par \par colonoscopy updated in 2021 w Dr Mari Ha, tubular adenoma noted, repeat 5 years advised

## 2023-03-09 NOTE — HEALTH RISK ASSESSMENT
[No] : In the past 12 months have you used drugs other than those required for medical reasons? No [Patient reported mammogram was normal] : Patient reported mammogram was normal [Patient reported PAP Smear was normal] : Patient reported PAP Smear was normal [Patient reported bone density results were abnormal] : Patient reported bone density results were abnormal [None] : None [Employed] : employed [] :  [MammogramDate] : 06/22 [PapSmearDate] : 2022 [BoneDensityDate] : 11/22 [ColonoscopyDate] : 11/21 [ColonoscopyComments] : - benign polyp -repeat 5 years advised

## 2023-04-25 ENCOUNTER — APPOINTMENT (OUTPATIENT)
Dept: ENDOCRINOLOGY | Facility: CLINIC | Age: 58
End: 2023-04-25
Payer: COMMERCIAL

## 2023-04-25 VITALS
BODY MASS INDEX: 21.85 KG/M2 | OXYGEN SATURATION: 98 % | HEART RATE: 71 BPM | WEIGHT: 128 LBS | HEIGHT: 64 IN | SYSTOLIC BLOOD PRESSURE: 92 MMHG | DIASTOLIC BLOOD PRESSURE: 60 MMHG

## 2023-04-25 PROCEDURE — 99214 OFFICE O/P EST MOD 30 MIN: CPT

## 2023-04-25 NOTE — HISTORY OF PRESENT ILLNESS
[FreeTextEntry1] : Ms. ANTONI MAYORGA is 57 year old female here to follow up for Hashimoto thyroiditis. \par \par She was diagnosed hypothyroidism before her pregnancy in 1994, son is now in medical school, her son is starting anesthesia residency. \par \par She was treated with Synthroid from round [id number] . \par She did IVF afterwards and was pregnant with twins.\par \par Pritesh and Glenny graduated in 2019.  They are working from home.  Her oldest child is a anesthesiology resident\par \par She had a dermoid surgery for brain and was on Keppra for a couple of months in 2010.  No past or current steroid regimen.  \par \par She's taking Vitamin D 2000 Iu daily.  \par \par She feels well in general, a little fatigue.  But fatigue has been constant.  \par She denies dry skin.  She has no excess hair loss . She reports no constipation, every other day and that hickey't been changed . \par \par Last visit, she was found to have osteoporosis on screening bone density, femoral neck -2.4, total hip -2.7, spine 1.2 osteoporosis.  At that time she was not interested in starting any medication for osteoporosis.\par \par She was given referral for physical therapy which she had not gone yet.\par Patient discharged Osteocel for once.  She believes that is too expensive.\par \par She was prescribed alendronate last year but never took the medication.\par \par Alendronate was prescribed again 6 months ago, patient stated that her doctor medication only once or twice but had side effects therefore discontinued the medication.\par \par

## 2023-04-25 NOTE — DATA REVIEWED
[FreeTextEntry1] : Bone mineral density\par Lumbar spine\par 11/15/2022: BMD 0.816, T score -2.1, -3.9%, -3.9%\par 9/24/2020: BMD 0.850, T score -1.8\par \par Total hip: \par 11/15/2022: BMD 0.568, T score -3.5, Z score -2.2\par 9/24/2020: BMD 0.615, T score -2.7, -7.6%*\par \par Femoral neck\par 11/15/2022: BMD 0.456, T score -3.5, -13.9%*\par 9/24/2020: BMD 0.530, T score -2.9

## 2023-04-25 NOTE — ASSESSMENT
[FreeTextEntry1] : Patient is a 57-year-old female with history of Hashimoto thyroiditis, now with subclinical hypothyroidism, osteoporosis, here for endocrinology followup.\par \par 1.  Osteoporosis\par Patient was recently diagnosed with osteoporosis, with femoral neck scored -2.7.\par Work up for secondary osteoporosis including a parathyroid hormone, vitamin D level, calcium, as well as TSH level is within normal limits.  Patient had no premature menopause. \par \par Bone mineral density\par Date:BMD, T score, BMD change vs Baseline, BMD change vs. Previous\par \par Lumbar spine\par 11/15/2022: BMD 0.816, T score -2.1, -3.9%\par 9/24/2020: BMD 0.850, T score -1.8\par \par Total hip: \par 11/15/2022: BMD 0.568, T score -3.5, Z score -2.2\par 9/24/2020: BMD 0.615, T score -2.7, -7.6%*\par \par Femoral neck\par 11/15/2022: BMD 0.456, T score -3.5, -13.9%*\par 9/24/2020: BMD 0.530, T score -2.9\par \par \par Information about osteoporosis provided for patient and printed.\par She was prescribed alendronate last year but never took it.\par Given worsening bone mineral density, treatment options discussed again. \par I believe we can try to use Prolia given the side effects with oral bisphosphonate \par I will follow-up with her in 6 months when sure adherence to medication.\par \par 2.  Subclinical Hypothyroidism\par Patient denying any clinical symptoms of hypothyroidism.  For now and monitor with thyroid function tests, informed her that no medication is indicated for now unless a TSH is greater than 10 or if she has significant signs and symptoms of hypothyroidism.  She elected to continue monitoring with lower instead of starting another medication for now. \par We will be seeing her primary care doctor next month.\par To repeat TFT in 2023.\par \par

## 2023-06-12 ENCOUNTER — APPOINTMENT (OUTPATIENT)
Dept: ULTRASOUND IMAGING | Facility: IMAGING CENTER | Age: 58
End: 2023-06-12
Payer: COMMERCIAL

## 2023-06-12 ENCOUNTER — APPOINTMENT (OUTPATIENT)
Dept: MAMMOGRAPHY | Facility: IMAGING CENTER | Age: 58
End: 2023-06-12
Payer: COMMERCIAL

## 2023-06-12 ENCOUNTER — OUTPATIENT (OUTPATIENT)
Dept: OUTPATIENT SERVICES | Facility: HOSPITAL | Age: 58
LOS: 1 days | End: 2023-06-12
Payer: COMMERCIAL

## 2023-06-12 DIAGNOSIS — Z00.8 ENCOUNTER FOR OTHER GENERAL EXAMINATION: ICD-10-CM

## 2023-06-12 PROCEDURE — 77063 BREAST TOMOSYNTHESIS BI: CPT | Mod: 26,59

## 2023-06-12 PROCEDURE — 77067 SCR MAMMO BI INCL CAD: CPT | Mod: 26,59

## 2023-06-12 PROCEDURE — 77065 DX MAMMO INCL CAD UNI: CPT | Mod: 26,GG,RT

## 2023-06-12 PROCEDURE — 76641 ULTRASOUND BREAST COMPLETE: CPT

## 2023-06-12 PROCEDURE — 76641 ULTRASOUND BREAST COMPLETE: CPT | Mod: 26,50

## 2023-06-12 PROCEDURE — 77063 BREAST TOMOSYNTHESIS BI: CPT

## 2023-06-12 PROCEDURE — 77065 DX MAMMO INCL CAD UNI: CPT

## 2023-06-12 PROCEDURE — 77067 SCR MAMMO BI INCL CAD: CPT

## 2023-10-30 ENCOUNTER — APPOINTMENT (OUTPATIENT)
Dept: ENDOCRINOLOGY | Facility: CLINIC | Age: 58
End: 2023-10-30

## 2024-03-04 ENCOUNTER — APPOINTMENT (OUTPATIENT)
Dept: ENDOCRINOLOGY | Facility: CLINIC | Age: 59
End: 2024-03-04
Payer: COMMERCIAL

## 2024-03-04 VITALS
OXYGEN SATURATION: 95 % | HEIGHT: 64 IN | HEART RATE: 64 BPM | DIASTOLIC BLOOD PRESSURE: 66 MMHG | BODY MASS INDEX: 22.02 KG/M2 | WEIGHT: 129 LBS | SYSTOLIC BLOOD PRESSURE: 98 MMHG

## 2024-03-04 DIAGNOSIS — E07.9 DISORDER OF THYROID, UNSPECIFIED: ICD-10-CM

## 2024-03-04 PROCEDURE — 99214 OFFICE O/P EST MOD 30 MIN: CPT

## 2024-03-04 RX ORDER — ALENDRONATE SODIUM 70 MG/1
70 TABLET ORAL
Qty: 12 | Refills: 0 | Status: DISCONTINUED | COMMUNITY
Start: 2021-07-30 | End: 2024-03-04

## 2024-03-04 NOTE — HISTORY OF PRESENT ILLNESS
[FreeTextEntry1] : Ms. ANTONI MAYORGA is 58 year old female here to follow up for Hashimoto thyroiditis.   She was diagnosed hypothyroidism before her pregnancy in 1994, son is now in medical school, her son is starting anesthesia residency.   She was treated with Synthroid from round [id number] .  She did IVF afterwards and was pregnant with twins.  Pritesh and Glenny graduated in 2019.  They are working from home.  Her oldest child is a anesthesiology resident  She had a dermoid surgery for brain and was on Keppra for a couple of months in 2010.  No past or current steroid regimen.    She's taking Vitamin D 2000 Iu daily.    Last visit, she was found to have osteoporosis on screening bone density, femoral neck -2.4, total hip -2.7, spine 1.2 osteoporosis.  At that time she was not interested in starting any medication for osteoporosis.  Was prescribed Alendronate after discussion but she only took a few tablets but stopped due to concern for side effects.

## 2024-03-04 NOTE — PHYSICAL EXAM
[Alert] : alert [Well Nourished] : well nourished [No Acute Distress] : no acute distress [Well Developed] : well developed [Normal Sclera/Conjunctiva] : normal sclera/conjunctiva [EOMI] : extra ocular movement intact [No Proptosis] : no proptosis [Normal Oropharynx] : the oropharynx was normal [Thyroid Not Enlarged] : the thyroid was not enlarged [No Thyroid Nodules] : no palpable thyroid nodules [No Respiratory Distress] : no respiratory distress [No Accessory Muscle Use] : no accessory muscle use [Clear to Auscultation] : lungs were clear to auscultation bilaterally [Normal S1, S2] : normal S1 and S2 [Normal Rate] : heart rate was normal [Regular Rhythm] : with a regular rhythm [No Edema] : no peripheral edema [Pedal Pulses Normal] : the pedal pulses are present [Not Distended] : not distended [Normal Anterior Cervical Nodes] : no anterior cervical lymphadenopathy [No Spinal Tenderness] : no spinal tenderness [Spine Straight] : spine straight [Normal Gait] : normal gait [No Stigmata of Cushings Syndrome] : no stigmata of Cushings Syndrome [No Rash] : no rash [Acanthosis Nigricans] : no acanthosis nigricans [Normal Reflexes] : deep tendon reflexes were 2+ and symmetric [No Tremors] : no tremors [Oriented x3] : oriented to person, place, and time [Normal Affect] : the affect was normal [Normal Insight/Judgement] : insight and judgment were intact [Normal Mood] : the mood was normal Declines

## 2024-03-12 ENCOUNTER — APPOINTMENT (OUTPATIENT)
Dept: INTERNAL MEDICINE | Facility: CLINIC | Age: 59
End: 2024-03-12
Payer: COMMERCIAL

## 2024-03-12 ENCOUNTER — NON-APPOINTMENT (OUTPATIENT)
Age: 59
End: 2024-03-12

## 2024-03-12 VITALS
BODY MASS INDEX: 21.34 KG/M2 | SYSTOLIC BLOOD PRESSURE: 102 MMHG | WEIGHT: 125 LBS | TEMPERATURE: 98.2 F | HEART RATE: 63 BPM | OXYGEN SATURATION: 98 % | DIASTOLIC BLOOD PRESSURE: 64 MMHG | HEIGHT: 64 IN

## 2024-03-12 DIAGNOSIS — M81.0 AGE-RELATED OSTEOPOROSIS W/OUT CURRENT PATHOLOGICAL FRACTURE: ICD-10-CM

## 2024-03-12 DIAGNOSIS — Z00.00 ENCOUNTER FOR GENERAL ADULT MEDICAL EXAMINATION W/OUT ABNORMAL FINDINGS: ICD-10-CM

## 2024-03-12 PROCEDURE — 99396 PREV VISIT EST AGE 40-64: CPT | Mod: 25

## 2024-03-12 PROCEDURE — 36415 COLL VENOUS BLD VENIPUNCTURE: CPT

## 2024-03-12 PROCEDURE — 93000 ELECTROCARDIOGRAM COMPLETE: CPT

## 2024-03-12 NOTE — PHYSICAL EXAM
[No Acute Distress] : no acute distress [Well Nourished] : well nourished [Well Developed] : well developed [Well-Appearing] : well-appearing [Normal Voice/Communication] : normal voice/communication [Normal Sclera/Conjunctiva] : normal sclera/conjunctiva [PERRL] : pupils equal round and reactive to light [Normal Oropharynx] : the oropharynx was normal [Normal Outer Ear/Nose] : the outer ears and nose were normal in appearance [Normal TMs] : both tympanic membranes were normal [No JVD] : no jugular venous distention [No Lymphadenopathy] : no lymphadenopathy [Supple] : supple [Thyroid Normal, No Nodules] : the thyroid was normal and there were no nodules present [No Respiratory Distress] : no respiratory distress  [No Accessory Muscle Use] : no accessory muscle use [Normal Rate] : normal rate  [Clear to Auscultation] : lungs were clear to auscultation bilaterally [Regular Rhythm] : with a regular rhythm [Normal S1, S2] : normal S1 and S2 [No Murmur] : no murmur heard [No Carotid Bruits] : no carotid bruits [No Abdominal Bruit] : a ~M bruit was not heard ~T in the abdomen [No Varicosities] : no varicosities [Pedal Pulses Present] : the pedal pulses are present [No Edema] : there was no peripheral edema [No Palpable Aorta] : no palpable aorta [Declined Breast Exam] : declined breast exam  [Soft] : abdomen soft [Non Tender] : non-tender [Non-distended] : non-distended [No Masses] : no abdominal mass palpated [No HSM] : no HSM [Normal Bowel Sounds] : normal bowel sounds [No Spinal Tenderness] : no spinal tenderness [No Joint Swelling] : no joint swelling [Grossly Normal Strength/Tone] : grossly normal strength/tone [No Rash] : no rash [Coordination Grossly Intact] : coordination grossly intact [No Focal Deficits] : no focal deficits [Normal Gait] : normal gait [Speech Grossly Normal] : speech grossly normal [Alert and Oriented x3] : oriented to person, place, and time [Normal Affect] : the affect was normal [Normal Insight/Judgement] : insight and judgment were intact [Normal Mood] : the mood was normal [Normal Supraclavicular Nodes] : no supraclavicular lymphadenopathy [Normal Posterior Cervical Nodes] : no posterior cervical lymphadenopathy [Normal Anterior Cervical Nodes] : no anterior cervical lymphadenopathy [Memory Grossly Normal] : memory grossly normal [de-identified] : done w GYN

## 2024-03-12 NOTE — ASSESSMENT
[FreeTextEntry1] :  discussed w pt   reviewed updated hx  check routine labs as below  monitor thyroid function   endocrine f/u as scheduled, update DEXA, she prefers to defer tx for osteoporosis , cont Vit D, exercise   low chol intake, monitor labs   cont GYN screening , breast imaging as scheduled   colonoscopy screening UTD 2021 , repeat advised in 2026   reviewed vaccines, she declines additional vaccines for now  noted her description of one episode of very brief loss of attention while sitting at the table. no recurrence. she has made appt w neurologist, advised if any recurrence to call and will reevaluate   RTO yearly for routine exam or earlier prn if any new concerns
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
## 2024-03-12 NOTE — HEALTH RISK ASSESSMENT
[No] : In the past 12 months have you used drugs other than those required for medical reasons? No [Patient reported mammogram was normal] : Patient reported mammogram was normal [Patient reported PAP Smear was normal] : Patient reported PAP Smear was normal [None] : None [Patient reported bone density results were abnormal] : Patient reported bone density results were abnormal [Employed] : employed [] :  [Never] : Never [MammogramDate] : 06/22 [PapSmearDate] : 2022 [BoneDensityDate] : 11/22 [ColonoscopyDate] : 11/21 [ColonoscopyComments] : - benign polyp -repeat 5 years advised

## 2024-03-12 NOTE — DATA REVIEWED
[FreeTextEntry1] : EKG - NSR, LAFB unchanged, no ST or T wave changes, nonspecific lateral T wave flattening

## 2024-03-13 LAB
25(OH)D3 SERPL-MCNC: 45.4 NG/ML
ALBUMIN SERPL ELPH-MCNC: 4.6 G/DL
ALP BLD-CCNC: 42 U/L
ALT SERPL-CCNC: 9 U/L
ANION GAP SERPL CALC-SCNC: 10 MMOL/L
APPEARANCE: CLEAR
AST SERPL-CCNC: 16 U/L
BACTERIA: NEGATIVE /HPF
BASOPHILS # BLD AUTO: 0.05 K/UL
BASOPHILS NFR BLD AUTO: 1.4 %
BILIRUB SERPL-MCNC: 0.5 MG/DL
BILIRUBIN URINE: NEGATIVE
BLOOD URINE: NEGATIVE
BUN SERPL-MCNC: 14 MG/DL
CALCIUM SERPL-MCNC: 9.8 MG/DL
CAST: 0 /LPF
CHLORIDE SERPL-SCNC: 103 MMOL/L
CHOLEST SERPL-MCNC: 194 MG/DL
CO2 SERPL-SCNC: 27 MMOL/L
COLOR: YELLOW
CREAT SERPL-MCNC: 0.65 MG/DL
EGFR: 101 ML/MIN/1.73M2
EOSINOPHIL # BLD AUTO: 0.05 K/UL
EOSINOPHIL NFR BLD AUTO: 1.4 %
EPITHELIAL CELLS: 2 /HPF
ESTIMATED AVERAGE GLUCOSE: 105 MG/DL
GLUCOSE QUALITATIVE U: NEGATIVE MG/DL
GLUCOSE SERPL-MCNC: 82 MG/DL
HBA1C MFR BLD HPLC: 5.3 %
HCT VFR BLD CALC: 40.6 %
HDLC SERPL-MCNC: 52 MG/DL
HGB BLD-MCNC: 13 G/DL
IMM GRANULOCYTES NFR BLD AUTO: 0 %
KETONES URINE: ABNORMAL MG/DL
LDLC SERPL CALC-MCNC: 135 MG/DL
LEUKOCYTE ESTERASE URINE: NEGATIVE
LYMPHOCYTES # BLD AUTO: 1.98 K/UL
LYMPHOCYTES NFR BLD AUTO: 54.2 %
MAN DIFF?: NORMAL
MCHC RBC-ENTMCNC: 30.4 PG
MCHC RBC-ENTMCNC: 32 GM/DL
MCV RBC AUTO: 94.9 FL
MICROSCOPIC-UA: NORMAL
MONOCYTES # BLD AUTO: 0.3 K/UL
MONOCYTES NFR BLD AUTO: 8.2 %
NEUTROPHILS # BLD AUTO: 1.27 K/UL
NEUTROPHILS NFR BLD AUTO: 34.8 %
NITRITE URINE: NEGATIVE
NONHDLC SERPL-MCNC: 142 MG/DL
PH URINE: 5.5
PLATELET # BLD AUTO: 262 K/UL
POTASSIUM SERPL-SCNC: 4 MMOL/L
PROT SERPL-MCNC: 6.8 G/DL
PROTEIN URINE: NEGATIVE MG/DL
RBC # BLD: 4.28 M/UL
RBC # FLD: 12.7 %
RED BLOOD CELLS URINE: 1 /HPF
SODIUM SERPL-SCNC: 140 MMOL/L
SPECIFIC GRAVITY URINE: 1.02
T4 FREE SERPL-MCNC: 1.1 NG/DL
TRIGL SERPL-MCNC: 39 MG/DL
TSH SERPL-ACNC: 3.69 UIU/ML
UROBILINOGEN URINE: 0.2 MG/DL
WBC # FLD AUTO: 3.65 K/UL
WHITE BLOOD CELLS URINE: 1 /HPF

## 2024-04-04 ENCOUNTER — APPOINTMENT (OUTPATIENT)
Dept: ENDOCRINOLOGY | Facility: CLINIC | Age: 59
End: 2024-04-04

## 2024-05-20 ENCOUNTER — APPOINTMENT (OUTPATIENT)
Dept: ENDOCRINOLOGY | Facility: CLINIC | Age: 59
End: 2024-05-20

## 2024-06-17 ENCOUNTER — APPOINTMENT (OUTPATIENT)
Dept: ULTRASOUND IMAGING | Facility: IMAGING CENTER | Age: 59
End: 2024-06-17
Payer: COMMERCIAL

## 2024-06-17 ENCOUNTER — OUTPATIENT (OUTPATIENT)
Dept: OUTPATIENT SERVICES | Facility: HOSPITAL | Age: 59
LOS: 1 days | End: 2024-06-17
Payer: COMMERCIAL

## 2024-06-17 ENCOUNTER — APPOINTMENT (OUTPATIENT)
Dept: MAMMOGRAPHY | Facility: IMAGING CENTER | Age: 59
End: 2024-06-17
Payer: COMMERCIAL

## 2024-06-17 DIAGNOSIS — R92.2 INCONCLUSIVE MAMMOGRAM: ICD-10-CM

## 2024-06-17 PROCEDURE — 76641 ULTRASOUND BREAST COMPLETE: CPT | Mod: 26,50

## 2024-06-17 PROCEDURE — 77067 SCR MAMMO BI INCL CAD: CPT | Mod: 26

## 2024-06-17 PROCEDURE — 77063 BREAST TOMOSYNTHESIS BI: CPT | Mod: 26

## 2024-06-17 PROCEDURE — 76641 ULTRASOUND BREAST COMPLETE: CPT

## 2024-06-17 PROCEDURE — 77067 SCR MAMMO BI INCL CAD: CPT

## 2024-06-17 PROCEDURE — 77063 BREAST TOMOSYNTHESIS BI: CPT

## 2025-06-19 ENCOUNTER — APPOINTMENT (OUTPATIENT)
Dept: ULTRASOUND IMAGING | Facility: IMAGING CENTER | Age: 60
End: 2025-06-19
Payer: COMMERCIAL

## 2025-06-19 ENCOUNTER — APPOINTMENT (OUTPATIENT)
Dept: MAMMOGRAPHY | Facility: IMAGING CENTER | Age: 60
End: 2025-06-19
Payer: COMMERCIAL

## 2025-06-19 ENCOUNTER — OUTPATIENT (OUTPATIENT)
Dept: OUTPATIENT SERVICES | Facility: HOSPITAL | Age: 60
LOS: 1 days | End: 2025-06-19
Payer: COMMERCIAL

## 2025-06-19 DIAGNOSIS — R92.2 INCONCLUSIVE MAMMOGRAM: ICD-10-CM

## 2025-06-19 PROCEDURE — 77067 SCR MAMMO BI INCL CAD: CPT | Mod: 26

## 2025-06-19 PROCEDURE — 76641 ULTRASOUND BREAST COMPLETE: CPT | Mod: 26,50

## 2025-06-19 PROCEDURE — 77063 BREAST TOMOSYNTHESIS BI: CPT

## 2025-06-19 PROCEDURE — 77063 BREAST TOMOSYNTHESIS BI: CPT | Mod: 26

## 2025-06-19 PROCEDURE — 77067 SCR MAMMO BI INCL CAD: CPT

## 2025-06-19 PROCEDURE — 76641 ULTRASOUND BREAST COMPLETE: CPT
